# Patient Record
Sex: FEMALE | Race: OTHER | HISPANIC OR LATINO | URBAN - METROPOLITAN AREA
[De-identification: names, ages, dates, MRNs, and addresses within clinical notes are randomized per-mention and may not be internally consistent; named-entity substitution may affect disease eponyms.]

---

## 2017-10-08 ENCOUNTER — EMERGENCY (EMERGENCY)
Facility: HOSPITAL | Age: 72
LOS: 1 days | Discharge: HOME | End: 2017-10-08
Attending: EMERGENCY MEDICINE | Admitting: EMERGENCY MEDICINE
Payer: COMMERCIAL

## 2017-10-08 VITALS
HEART RATE: 72 BPM | WEIGHT: 145.95 LBS | HEIGHT: 64 IN | DIASTOLIC BLOOD PRESSURE: 81 MMHG | RESPIRATION RATE: 18 BRPM | TEMPERATURE: 99 F | OXYGEN SATURATION: 98 % | SYSTOLIC BLOOD PRESSURE: 132 MMHG

## 2017-10-08 VITALS
HEART RATE: 70 BPM | SYSTOLIC BLOOD PRESSURE: 126 MMHG | TEMPERATURE: 99 F | DIASTOLIC BLOOD PRESSURE: 73 MMHG | RESPIRATION RATE: 18 BRPM | OXYGEN SATURATION: 97 %

## 2017-10-08 DIAGNOSIS — Z98.51 TUBAL LIGATION STATUS: Chronic | ICD-10-CM

## 2017-10-08 DIAGNOSIS — Z98.89 OTHER SPECIFIED POSTPROCEDURAL STATES: Chronic | ICD-10-CM

## 2017-10-08 LAB
ALBUMIN SERPL ELPH-MCNC: 4.1 G/DL — SIGNIFICANT CHANGE UP (ref 3.3–5)
ALP SERPL-CCNC: 124 U/L — HIGH (ref 40–120)
ALT FLD-CCNC: 18 U/L — SIGNIFICANT CHANGE UP (ref 10–45)
ANION GAP SERPL CALC-SCNC: 13 MMOL/L — SIGNIFICANT CHANGE UP (ref 5–17)
APPEARANCE UR: CLEAR — SIGNIFICANT CHANGE UP
APTT BLD: 30.5 SEC — SIGNIFICANT CHANGE UP (ref 27.5–37.4)
AST SERPL-CCNC: 24 U/L — SIGNIFICANT CHANGE UP (ref 10–40)
BACTERIA # UR AUTO: PRESENT /HPF
BASOPHILS NFR BLD AUTO: 0.4 % — SIGNIFICANT CHANGE UP (ref 0–2)
BILIRUB SERPL-MCNC: 0.8 MG/DL — SIGNIFICANT CHANGE UP (ref 0.2–1.2)
BILIRUB UR-MCNC: NEGATIVE — SIGNIFICANT CHANGE UP
BUN SERPL-MCNC: 15 MG/DL — SIGNIFICANT CHANGE UP (ref 7–23)
CALCIUM SERPL-MCNC: 9.4 MG/DL — SIGNIFICANT CHANGE UP (ref 8.4–10.5)
CHLORIDE SERPL-SCNC: 104 MMOL/L — SIGNIFICANT CHANGE UP (ref 96–108)
CO2 SERPL-SCNC: 23 MMOL/L — SIGNIFICANT CHANGE UP (ref 22–31)
COLOR SPEC: YELLOW — SIGNIFICANT CHANGE UP
CREAT SERPL-MCNC: 0.87 MG/DL — SIGNIFICANT CHANGE UP (ref 0.5–1.3)
DIFF PNL FLD: (no result)
EOSINOPHIL NFR BLD AUTO: 2.3 % — SIGNIFICANT CHANGE UP (ref 0–6)
EPI CELLS # UR: SIGNIFICANT CHANGE UP /HPF
GLUCOSE SERPL-MCNC: 102 MG/DL — HIGH (ref 70–99)
GLUCOSE UR QL: NEGATIVE — SIGNIFICANT CHANGE UP
HCT VFR BLD CALC: 39.1 % — SIGNIFICANT CHANGE UP (ref 34.5–45)
HGB BLD-MCNC: 13.4 G/DL — SIGNIFICANT CHANGE UP (ref 11.5–15.5)
INR BLD: 1 — SIGNIFICANT CHANGE UP (ref 0.88–1.16)
KETONES UR-MCNC: NEGATIVE — SIGNIFICANT CHANGE UP
LEUKOCYTE ESTERASE UR-ACNC: NEGATIVE — SIGNIFICANT CHANGE UP
LYMPHOCYTES # BLD AUTO: 34.2 % — SIGNIFICANT CHANGE UP (ref 13–44)
MCHC RBC-ENTMCNC: 31.3 PG — SIGNIFICANT CHANGE UP (ref 27–34)
MCHC RBC-ENTMCNC: 34.3 G/DL — SIGNIFICANT CHANGE UP (ref 32–36)
MCV RBC AUTO: 91.4 FL — SIGNIFICANT CHANGE UP (ref 80–100)
MONOCYTES NFR BLD AUTO: 9.9 % — SIGNIFICANT CHANGE UP (ref 2–14)
NEUTROPHILS NFR BLD AUTO: 53.2 % — SIGNIFICANT CHANGE UP (ref 43–77)
NITRITE UR-MCNC: NEGATIVE — SIGNIFICANT CHANGE UP
PH UR: 5.5 — SIGNIFICANT CHANGE UP (ref 5–8)
PLATELET # BLD AUTO: 187 K/UL — SIGNIFICANT CHANGE UP (ref 150–400)
POTASSIUM SERPL-MCNC: 4.2 MMOL/L — SIGNIFICANT CHANGE UP (ref 3.5–5.3)
POTASSIUM SERPL-SCNC: 4.2 MMOL/L — SIGNIFICANT CHANGE UP (ref 3.5–5.3)
PROT SERPL-MCNC: 7.1 G/DL — SIGNIFICANT CHANGE UP (ref 6–8.3)
PROT UR-MCNC: NEGATIVE MG/DL — SIGNIFICANT CHANGE UP
PROTHROM AB SERPL-ACNC: 11.1 SEC — SIGNIFICANT CHANGE UP (ref 9.8–12.7)
RBC # BLD: 4.28 M/UL — SIGNIFICANT CHANGE UP (ref 3.8–5.2)
RBC # FLD: 12.4 % — SIGNIFICANT CHANGE UP (ref 10.3–16.9)
RBC CASTS # UR COMP ASSIST: (no result) /HPF
SODIUM SERPL-SCNC: 140 MMOL/L — SIGNIFICANT CHANGE UP (ref 135–145)
SP GR SPEC: 1.02 — SIGNIFICANT CHANGE UP (ref 1–1.03)
UROBILINOGEN FLD QL: 0.2 E.U./DL — SIGNIFICANT CHANGE UP
WBC # BLD: 5.6 K/UL — SIGNIFICANT CHANGE UP (ref 3.8–10.5)
WBC # FLD AUTO: 5.6 K/UL — SIGNIFICANT CHANGE UP (ref 3.8–10.5)
WBC UR QL: < 5 /HPF — SIGNIFICANT CHANGE UP

## 2017-10-08 PROCEDURE — 99285 EMERGENCY DEPT VISIT HI MDM: CPT

## 2017-10-08 PROCEDURE — 85730 THROMBOPLASTIN TIME PARTIAL: CPT

## 2017-10-08 PROCEDURE — 80053 COMPREHEN METABOLIC PANEL: CPT

## 2017-10-08 PROCEDURE — 74177 CT ABD & PELVIS W/CONTRAST: CPT | Mod: 26

## 2017-10-08 PROCEDURE — 81001 URINALYSIS AUTO W/SCOPE: CPT

## 2017-10-08 PROCEDURE — 85025 COMPLETE CBC W/AUTO DIFF WBC: CPT

## 2017-10-08 PROCEDURE — 96374 THER/PROPH/DIAG INJ IV PUSH: CPT | Mod: XU

## 2017-10-08 PROCEDURE — 85610 PROTHROMBIN TIME: CPT

## 2017-10-08 PROCEDURE — 99284 EMERGENCY DEPT VISIT MOD MDM: CPT | Mod: 25

## 2017-10-08 PROCEDURE — 74177 CT ABD & PELVIS W/CONTRAST: CPT

## 2017-10-08 PROCEDURE — 36415 COLL VENOUS BLD VENIPUNCTURE: CPT

## 2017-10-08 RX ORDER — IOHEXOL 300 MG/ML
50 INJECTION, SOLUTION INTRAVENOUS ONCE
Qty: 0 | Refills: 0 | Status: COMPLETED | OUTPATIENT
Start: 2017-10-08 | End: 2017-10-08

## 2017-10-08 RX ORDER — ONDANSETRON 8 MG/1
4 TABLET, FILM COATED ORAL ONCE
Qty: 0 | Refills: 0 | Status: COMPLETED | OUTPATIENT
Start: 2017-10-08 | End: 2017-10-08

## 2017-10-08 RX ORDER — SODIUM CHLORIDE 9 MG/ML
1000 INJECTION INTRAMUSCULAR; INTRAVENOUS; SUBCUTANEOUS ONCE
Qty: 0 | Refills: 0 | Status: COMPLETED | OUTPATIENT
Start: 2017-10-08 | End: 2017-10-08

## 2017-10-08 RX ADMIN — SODIUM CHLORIDE 1000 MILLILITER(S): 9 INJECTION INTRAMUSCULAR; INTRAVENOUS; SUBCUTANEOUS at 11:18

## 2017-10-08 RX ADMIN — ONDANSETRON 4 MILLIGRAM(S): 8 TABLET, FILM COATED ORAL at 11:22

## 2017-10-08 RX ADMIN — IOHEXOL 50 MILLILITER(S): 300 INJECTION, SOLUTION INTRAVENOUS at 11:22

## 2017-10-08 NOTE — ED ADULT NURSE NOTE - OBJECTIVE STATEMENT
Pt presents to ED c/o intermittent RLQ abdominal pain for the past few days, worsening this morning, associated w chills and mild nausea and no vomiting.

## 2017-10-08 NOTE — ED PROVIDER NOTE - MEDICAL DECISION MAKING DETAILS
pt with lower abd pain, nausea, decreased appetite, pe - mild right lower quadrant tenderness, labs show mild hematuria, ct with duodenitis - pt aware to follow up with PMD and to eat light small meals. also aware to have repeat u/a done

## 2017-10-08 NOTE — ED PROVIDER NOTE - OBJECTIVE STATEMENT
71 yo f with pmh of GERD, tubal ligation, vertigo, transvaginal myomectomy presents to ED with right lower quadrant pain x 1 day associated with nausea and decreased appetite.  Denies fever, chills, urinary symptoms.  No vomiting or diarrhea.

## 2017-10-08 NOTE — ED ADULT NURSE NOTE - CHPI ED SYMPTOMS NEG
no vomiting/no diarrhea/no abdominal distension/no burning urination/no blood in stool/no hematuria/no fever/no dysuria

## 2017-10-08 NOTE — ED ADULT TRIAGE NOTE - OTHER COMPLAINTS
Pt C/O RLQ pain, chills and nausea since 4AM. Pt denies urinary symptom, diarrhea, vomiting, chest pain, SOB, dizziness. Hx of GERD, vertigo.

## 2017-10-12 DIAGNOSIS — K52.9 NONINFECTIVE GASTROENTERITIS AND COLITIS, UNSPECIFIED: ICD-10-CM

## 2017-10-12 DIAGNOSIS — R10.31 RIGHT LOWER QUADRANT PAIN: ICD-10-CM

## 2017-10-12 DIAGNOSIS — Z79.899 OTHER LONG TERM (CURRENT) DRUG THERAPY: ICD-10-CM

## 2018-07-16 ENCOUNTER — INPATIENT (INPATIENT)
Facility: HOSPITAL | Age: 73
LOS: 1 days | Discharge: HOME | DRG: 419 | End: 2018-07-18
Attending: SURGERY | Admitting: SURGERY
Payer: MEDICARE

## 2018-07-16 VITALS
WEIGHT: 145.95 LBS | HEART RATE: 67 BPM | TEMPERATURE: 98 F | OXYGEN SATURATION: 98 % | DIASTOLIC BLOOD PRESSURE: 84 MMHG | SYSTOLIC BLOOD PRESSURE: 153 MMHG | RESPIRATION RATE: 18 BRPM

## 2018-07-16 DIAGNOSIS — Z98.89 OTHER SPECIFIED POSTPROCEDURAL STATES: Chronic | ICD-10-CM

## 2018-07-16 DIAGNOSIS — K81.9 CHOLECYSTITIS, UNSPECIFIED: ICD-10-CM

## 2018-07-16 DIAGNOSIS — R42 DIZZINESS AND GIDDINESS: ICD-10-CM

## 2018-07-16 DIAGNOSIS — K21.9 GASTRO-ESOPHAGEAL REFLUX DISEASE WITHOUT ESOPHAGITIS: ICD-10-CM

## 2018-07-16 DIAGNOSIS — Z98.51 TUBAL LIGATION STATUS: Chronic | ICD-10-CM

## 2018-07-16 DIAGNOSIS — M81.0 AGE-RELATED OSTEOPOROSIS WITHOUT CURRENT PATHOLOGICAL FRACTURE: ICD-10-CM

## 2018-07-16 LAB
ALBUMIN SERPL ELPH-MCNC: 4.3 G/DL — SIGNIFICANT CHANGE UP (ref 3.3–5)
ALP SERPL-CCNC: 120 U/L — SIGNIFICANT CHANGE UP (ref 40–120)
ALT FLD-CCNC: 12 U/L — SIGNIFICANT CHANGE UP (ref 10–45)
ANION GAP SERPL CALC-SCNC: 12 MMOL/L — SIGNIFICANT CHANGE UP (ref 5–17)
APPEARANCE UR: CLEAR — SIGNIFICANT CHANGE UP
APTT BLD: 26.8 SEC — LOW (ref 27.5–37.4)
APTT BLD: 29.1 SEC — SIGNIFICANT CHANGE UP (ref 27.5–37.4)
AST SERPL-CCNC: 22 U/L — SIGNIFICANT CHANGE UP (ref 10–40)
BACTERIA # UR AUTO: PRESENT /HPF
BASOPHILS NFR BLD AUTO: 0.3 % — SIGNIFICANT CHANGE UP (ref 0–2)
BILIRUB SERPL-MCNC: 0.6 MG/DL — SIGNIFICANT CHANGE UP (ref 0.2–1.2)
BILIRUB UR-MCNC: NEGATIVE — SIGNIFICANT CHANGE UP
BLD GP AB SCN SERPL QL: NEGATIVE — SIGNIFICANT CHANGE UP
BLD GP AB SCN SERPL QL: NEGATIVE — SIGNIFICANT CHANGE UP
BUN SERPL-MCNC: 17 MG/DL — SIGNIFICANT CHANGE UP (ref 7–23)
CALCIUM SERPL-MCNC: 9.8 MG/DL — SIGNIFICANT CHANGE UP (ref 8.4–10.5)
CHLORIDE SERPL-SCNC: 102 MMOL/L — SIGNIFICANT CHANGE UP (ref 96–108)
CO2 SERPL-SCNC: 25 MMOL/L — SIGNIFICANT CHANGE UP (ref 22–31)
COLOR SPEC: YELLOW — SIGNIFICANT CHANGE UP
CREAT SERPL-MCNC: 0.88 MG/DL — SIGNIFICANT CHANGE UP (ref 0.5–1.3)
DIFF PNL FLD: ABNORMAL
EOSINOPHIL NFR BLD AUTO: 1.4 % — SIGNIFICANT CHANGE UP (ref 0–6)
EPI CELLS # UR: SIGNIFICANT CHANGE UP /HPF (ref 0–5)
GLUCOSE SERPL-MCNC: 150 MG/DL — HIGH (ref 70–99)
GLUCOSE UR QL: NEGATIVE — SIGNIFICANT CHANGE UP
HCT VFR BLD CALC: 41.8 % — SIGNIFICANT CHANGE UP (ref 34.5–45)
HGB BLD-MCNC: 13.5 G/DL — SIGNIFICANT CHANGE UP (ref 11.5–15.5)
INR BLD: 1.02 — SIGNIFICANT CHANGE UP (ref 0.88–1.16)
INR BLD: 1.05 — SIGNIFICANT CHANGE UP (ref 0.88–1.16)
KETONES UR-MCNC: NEGATIVE — SIGNIFICANT CHANGE UP
LEUKOCYTE ESTERASE UR-ACNC: ABNORMAL
LIDOCAIN IGE QN: 135 U/L — HIGH (ref 7–60)
LYMPHOCYTES # BLD AUTO: 15.5 % — SIGNIFICANT CHANGE UP (ref 13–44)
MCHC RBC-ENTMCNC: 31 PG — SIGNIFICANT CHANGE UP (ref 27–34)
MCHC RBC-ENTMCNC: 32.3 G/DL — SIGNIFICANT CHANGE UP (ref 32–36)
MCV RBC AUTO: 96.1 FL — SIGNIFICANT CHANGE UP (ref 80–100)
MONOCYTES NFR BLD AUTO: 6.3 % — SIGNIFICANT CHANGE UP (ref 2–14)
NEUTROPHILS NFR BLD AUTO: 76.5 % — SIGNIFICANT CHANGE UP (ref 43–77)
NITRITE UR-MCNC: NEGATIVE — SIGNIFICANT CHANGE UP
PH UR: 7 — SIGNIFICANT CHANGE UP (ref 5–8)
PLATELET # BLD AUTO: 163 K/UL — SIGNIFICANT CHANGE UP (ref 150–400)
POTASSIUM SERPL-MCNC: 4 MMOL/L — SIGNIFICANT CHANGE UP (ref 3.5–5.3)
POTASSIUM SERPL-SCNC: 4 MMOL/L — SIGNIFICANT CHANGE UP (ref 3.5–5.3)
PROT SERPL-MCNC: 7.4 G/DL — SIGNIFICANT CHANGE UP (ref 6–8.3)
PROT UR-MCNC: NEGATIVE MG/DL — SIGNIFICANT CHANGE UP
PROTHROM AB SERPL-ACNC: 11.3 SEC — SIGNIFICANT CHANGE UP (ref 9.8–12.7)
PROTHROM AB SERPL-ACNC: 11.7 SEC — SIGNIFICANT CHANGE UP (ref 9.8–12.7)
RBC # BLD: 4.35 M/UL — SIGNIFICANT CHANGE UP (ref 3.8–5.2)
RBC # FLD: 12.3 % — SIGNIFICANT CHANGE UP (ref 10.3–16.9)
RBC CASTS # UR COMP ASSIST: ABNORMAL /HPF
RH IG SCN BLD-IMP: POSITIVE — SIGNIFICANT CHANGE UP
RH IG SCN BLD-IMP: POSITIVE — SIGNIFICANT CHANGE UP
SODIUM SERPL-SCNC: 139 MMOL/L — SIGNIFICANT CHANGE UP (ref 135–145)
SP GR SPEC: 1.02 — SIGNIFICANT CHANGE UP (ref 1–1.03)
UROBILINOGEN FLD QL: 0.2 E.U./DL — SIGNIFICANT CHANGE UP
WBC # BLD: 7.9 K/UL — SIGNIFICANT CHANGE UP (ref 3.8–10.5)
WBC # FLD AUTO: 7.9 K/UL — SIGNIFICANT CHANGE UP (ref 3.8–10.5)
WBC UR QL: < 5 /HPF — SIGNIFICANT CHANGE UP

## 2018-07-16 PROCEDURE — 74177 CT ABD & PELVIS W/CONTRAST: CPT | Mod: 26

## 2018-07-16 PROCEDURE — 76705 ECHO EXAM OF ABDOMEN: CPT | Mod: 26

## 2018-07-16 PROCEDURE — 71046 X-RAY EXAM CHEST 2 VIEWS: CPT | Mod: 26

## 2018-07-16 PROCEDURE — 99285 EMERGENCY DEPT VISIT HI MDM: CPT | Mod: 25

## 2018-07-16 PROCEDURE — 93010 ELECTROCARDIOGRAM REPORT: CPT

## 2018-07-16 RX ORDER — CEFAZOLIN SODIUM 1 G
2000 VIAL (EA) INJECTION EVERY 8 HOURS
Qty: 0 | Refills: 0 | Status: DISCONTINUED | OUTPATIENT
Start: 2018-07-16 | End: 2018-07-17

## 2018-07-16 RX ORDER — PANTOPRAZOLE SODIUM 20 MG/1
40 TABLET, DELAYED RELEASE ORAL ONCE
Qty: 0 | Refills: 0 | Status: COMPLETED | OUTPATIENT
Start: 2018-07-16 | End: 2018-07-16

## 2018-07-16 RX ORDER — PIPERACILLIN AND TAZOBACTAM 4; .5 G/20ML; G/20ML
3.38 INJECTION, POWDER, LYOPHILIZED, FOR SOLUTION INTRAVENOUS ONCE
Qty: 0 | Refills: 0 | Status: COMPLETED | OUTPATIENT
Start: 2018-07-16 | End: 2018-07-16

## 2018-07-16 RX ORDER — HEPARIN SODIUM 5000 [USP'U]/ML
5000 INJECTION INTRAVENOUS; SUBCUTANEOUS EVERY 8 HOURS
Qty: 0 | Refills: 0 | Status: DISCONTINUED | OUTPATIENT
Start: 2018-07-16 | End: 2018-07-18

## 2018-07-16 RX ORDER — SODIUM CHLORIDE 9 MG/ML
1000 INJECTION, SOLUTION INTRAVENOUS
Qty: 0 | Refills: 0 | Status: DISCONTINUED | OUTPATIENT
Start: 2018-07-16 | End: 2018-07-17

## 2018-07-16 RX ORDER — ACETAMINOPHEN 500 MG
650 TABLET ORAL EVERY 6 HOURS
Qty: 0 | Refills: 0 | Status: DISCONTINUED | OUTPATIENT
Start: 2018-07-16 | End: 2018-07-17

## 2018-07-16 RX ORDER — ONDANSETRON 8 MG/1
4 TABLET, FILM COATED ORAL EVERY 6 HOURS
Qty: 0 | Refills: 0 | Status: DISCONTINUED | OUTPATIENT
Start: 2018-07-16 | End: 2018-07-18

## 2018-07-16 RX ORDER — IOHEXOL 300 MG/ML
30 INJECTION, SOLUTION INTRAVENOUS ONCE
Qty: 0 | Refills: 0 | Status: COMPLETED | OUTPATIENT
Start: 2018-07-16 | End: 2018-07-16

## 2018-07-16 RX ORDER — ONDANSETRON 8 MG/1
4 TABLET, FILM COATED ORAL ONCE
Qty: 0 | Refills: 0 | Status: COMPLETED | OUTPATIENT
Start: 2018-07-16 | End: 2018-07-16

## 2018-07-16 RX ORDER — FAMOTIDINE 10 MG/ML
20 INJECTION INTRAVENOUS ONCE
Qty: 0 | Refills: 0 | Status: COMPLETED | OUTPATIENT
Start: 2018-07-16 | End: 2018-07-16

## 2018-07-16 RX ADMIN — Medication 100 MILLIGRAM(S): at 22:34

## 2018-07-16 RX ADMIN — PANTOPRAZOLE SODIUM 40 MILLIGRAM(S): 20 TABLET, DELAYED RELEASE ORAL at 07:50

## 2018-07-16 RX ADMIN — IOHEXOL 30 MILLILITER(S): 300 INJECTION, SOLUTION INTRAVENOUS at 07:50

## 2018-07-16 RX ADMIN — HEPARIN SODIUM 5000 UNIT(S): 5000 INJECTION INTRAVENOUS; SUBCUTANEOUS at 22:33

## 2018-07-16 RX ADMIN — PIPERACILLIN AND TAZOBACTAM 200 GRAM(S): 4; .5 INJECTION, POWDER, LYOPHILIZED, FOR SOLUTION INTRAVENOUS at 14:44

## 2018-07-16 RX ADMIN — ONDANSETRON 4 MILLIGRAM(S): 8 TABLET, FILM COATED ORAL at 07:50

## 2018-07-16 RX ADMIN — SODIUM CHLORIDE 125 MILLILITER(S): 9 INJECTION, SOLUTION INTRAVENOUS at 18:30

## 2018-07-16 RX ADMIN — Medication 30 MILLILITER(S): at 07:50

## 2018-07-16 RX ADMIN — FAMOTIDINE 20 MILLIGRAM(S): 10 INJECTION INTRAVENOUS at 10:56

## 2018-07-16 NOTE — ED PROVIDER NOTE - PROGRESS NOTE DETAILS
CT concerning for cholecystitis. WBC, lft's nl but lipase elevated.  ? cbd stone.  RUQ u/s ordered. RUQ u/s +; surg and gi consulted.  Abx ordered. Pt reviewed by gi - no pancreatitis on ct, nl lft's - they do not feel pt needs intervention and defer mgmt to surgery.  Surg eval pt, await their final recommendations.  Pt resting, pain free in ed since this am. TBA to surg

## 2018-07-16 NOTE — H&P ADULT - ASSESSMENT
74 yo F relatively healthy, found to have signs of acute cholecystitis on RUQ U/S    Will admit to [facility] under Dr. Laureano for ACS  NPO/IVF  Medicine Clearance  Add on for OR tomorrow for lap cholecystectomy   Minimal Pain Meds  Heparin SUBQ  IS/OOB

## 2018-07-16 NOTE — CONSULT NOTE ADULT - SUBJECTIVE AND OBJECTIVE BOX
INTERVAL HPI/OVERNIGHT EVENTS:  Patient seen and examined; Patient with abdominal pain and vomiting for 2 days; Today was the worst pain; No other medical problems except GERD; No cardio or pulmonary disease. No Diabetes; No smoking or ETOH          MEDICATIONS  (STANDING):  ceFAZolin   IVPB 2000 milliGRAM(s) IV Intermittent every 8 hours  heparin  Injectable 5000 Unit(s) SubCutaneous every 8 hours  lactated ringers. 1000 milliLiter(s) (125 mL/Hr) IV Continuous <Continuous>    MEDICATIONS  (PRN):  acetaminophen   Tablet. 650 milliGRAM(s) Oral every 6 hours PRN Moderate Pain (4 - 6)  ondansetron Injectable 4 milliGRAM(s) IV Push every 6 hours PRN Nausea      Allergies    No Known Allergies    Intolerances        Vital Signs Last 24 Hrs  T(C): 36.5 (2018 18:17), Max: 36.9 (2018 16:59)  T(F): 97.7 (2018 18:17), Max: 98.4 (2018 16:59)  HR: 62 (2018 18:17) (60 - 67)  BP: 130/83 (2018 18:17) (118/78 - 153/84)  BP(mean): --  RR: 16 (2018 18:17) (16 - 18)  SpO2: 95% (2018 18:17) (95% - 98%)          Constitutional: Awake    Eyes: ALISSA    ENMT: Negative    Neck: Supple    Back:  no tenderness     Respiratory:   clear    Cardiovascular: S1 S2    Gastrointestinal:  soft No real abdominal pain on palpation    Genitourinary:    Extremities: no edema    Vascular:    Neurological:    Skin:    Lymph Nodes:            LABS:                        13.5   7.9   )-----------( 163      ( 2018 06:48 )             41.8     07-16    139  |  102  |  17  ----------------------------<  150<H>  4.0   |  25  |  0.88    Ca    9.8      2018 06:48    TPro  7.4  /  Alb  4.3  /  TBili  0.6  /  DBili  x   /  AST  22  /  ALT  12  /  AlkPhos  120  07-16    PT/INR - ( 2018 14:40 )   PT: 11.3 sec;   INR: 1.02          PTT - ( 2018 14:40 )  PTT:26.8 sec  Urinalysis Basic - ( 2018 07:41 )    Color: Yellow / Appearance: Clear / S.020 / pH: x  Gluc: x / Ketone: NEGATIVE  / Bili: Negative / Urobili: 0.2 E.U./dL   Blood: x / Protein: NEGATIVE mg/dL / Nitrite: NEGATIVE   Leuk Esterase: Trace / RBC: 5-10 /HPF / WBC < 5 /HPF   Sq Epi: x / Non Sq Epi: 0-5 /HPF / Bacteria: Present /HPF        RADIOLOGY & ADDITIONAL TESTS:

## 2018-07-16 NOTE — ED PROVIDER NOTE - GASTROINTESTINAL, MLM
Abdomen soft, mild periumbilical and epigastric ttp, no rebound, no guarding. + referred ttp to rlq w/o sig rlq ttp

## 2018-07-16 NOTE — H&P ADULT - NSHPPHYSICALEXAM_GEN_ALL_CORE
General: NAD  Neurology: A&Ox3, nonfocal, ALSTON x 4  Eyes: EOMI, Gross vision intact  ENT/Neck: Neck supple, trachea midline, Gross hearing intact  Respiratory: CTA B/L, No wheezing, rales, rhonchi  CV: RRR, S1S2, no murmurs, rubs or gallops  Abdominal: Soft, No RUQ tenderness ND +BS,   Extremities: No edema, + peripheral pulses  Skin: No Rashes, Hematoma, Ecchymosis

## 2018-07-16 NOTE — ED PROVIDER NOTE - OBJECTIVE STATEMENT
72 yo female h/o GERD, tubal ligation, vertigo, transvaginal myomectomy, migraine c/o 72 yo female h/o GERD, tubal ligation, vertigo, transvaginal myomectomy, migraine c/o abd pain diffuse abd starting ~ 130a last pm w n/v x 1 (resembled food she ate, no blood) w small bm (nonbloody, no black stool) x 1.  Pain was 9/10, now 3/10.  Pt took tums but no other meds.  Pt reports similar pain last wk x several hours that resolved.  No h/o food intol, sx don't feel like her gerd.  No fever, dysuria, freq, urgency, hematuria, flank pain. No h/o gallstone.  Pain aching, constant, no radiation. No sick contacts, travel, suspicious foods.  No abd distension.

## 2018-07-16 NOTE — H&P ADULT - ATTENDING COMMENTS
Late note.  Pt seen on admission in ED.  Agree with above.  Also reports belt like pain around mid abdomen, which has resolved now.  Suspect acute cholecystitis with concomitant choledocholithiasis (belt like pain and mild elevation of lipase)  Recommend cholecystectomy.  Medical evaluation and clearance for surgery.  NPO, IVF, IV Abx.

## 2018-07-16 NOTE — H&P ADULT - HISTORY OF PRESENT ILLNESS
74 yo F, presenting with 2 days of intermittent epigastric pain, concern for acute cholecystitis. Patient with hx of PUD most recent endoscopy 3 years ago with evidence of gastritis. She states that her first episode of abdominal pain was Friday evening, it was nonspecific with no N/V/ No diarrhea, no fevers or chills. Pain went away on its own without any intervention. She was in her usual state of her health until Monday early AM, when the epigastric pain woke her up, she felt nauseous and decided to come to ED. At this time patient says pain has resolved, and she feels "like her self"    Patient reports she has never had pain in this fashion before. No fever/chills, no vomiting. Has underwent tubal ligation, no other abdominal operations.

## 2018-07-16 NOTE — ED PROVIDER NOTE - MEDICAL DECISION MAKING DETAILS
Pt c/o diffuse abd pain now much better but still present w n/v w mild ttp on exam.  ? early appy since referred ttp to rlq, ? biliary colic (no ruq ttp), low suspicion for sbo since minimally invasive abd surg w tubal ligation and no distension on exam, no uti sx.  Plan labs, ct abd; pt declined pain meds.  Reassess.

## 2018-07-16 NOTE — PROGRESS NOTE ADULT - SUBJECTIVE AND OBJECTIVE BOX
Team 4 Surgery Preop Note    73y old woman who presents with a chief complaint of abdominal pain.    Diagnosis: acute cholecystitis  Procedure: laparoscopic cholecystectomy  Surgeon: Georgi                          13.5   7.9   )-----------( 163      ( 2018 06:48 )             41.8     07-16    139  |  102  |  17  ----------------------------<  150<H>  4.0   |  25  |  0.88    Ca    9.8      2018 06:48    TPro  7.4  /  Alb  4.3  /  TBili  0.6  /  DBili  x   /  AST  22  /  ALT  12  /  AlkPhos  120  0716    PT/INR - ( 2018 19:09 )   PT: 11.7 sec;   INR: 1.05          PTT - ( 2018 19:09 )  PTT:29.1 sec  Urinalysis Basic - ( 2018 07:41 )    Color: Yellow / Appearance: Clear / S.020 / pH: x  Gluc: x / Ketone: NEGATIVE  / Bili: Negative / Urobili: 0.2 E.U./dL   Blood: x / Protein: NEGATIVE mg/dL / Nitrite: NEGATIVE   Leuk Esterase: Trace / RBC: 5-10 /HPF / WBC < 5 /HPF   Sq Epi: x / Non Sq Epi: 0-5 /HPF / Bacteria: Present /HPF        [ordered] Type & Screen  [x] CBC  [x] BMP  [x] PT/PTT/INR  [x] Urinalysis  [x] Chest X-ray  [ordered] EKG  [x] NPO/IVF  [pending] Consent  [n/a] Clearance  [x] Added on to OR Schedule  [n/a] Anti-coagulation held Team 4 Surgery Preop Note    73y old woman who presents with a chief complaint of abdominal pain.    Diagnosis: acute cholecystitis  Procedure: laparoscopic cholecystectomy  Surgeon: Georgi                          13.5   7.9   )-----------( 163      ( 2018 06:48 )             41.8     07-16    139  |  102  |  17  ----------------------------<  150<H>  4.0   |  25  |  0.88    Ca    9.8      2018 06:48    TPro  7.4  /  Alb  4.3  /  TBili  0.6  /  DBili  x   /  AST  22  /  ALT  12  /  AlkPhos  120  07-16    PT/INR - ( 2018 19:09 )   PT: 11.7 sec;   INR: 1.05          PTT - ( 2018 19:09 )  PTT:29.1 sec  Urinalysis Basic - ( 2018 07:41 )    Color: Yellow / Appearance: Clear / S.020 / pH: x  Gluc: x / Ketone: NEGATIVE  / Bili: Negative / Urobili: 0.2 E.U./dL   Blood: x / Protein: NEGATIVE mg/dL / Nitrite: NEGATIVE   Leuk Esterase: Trace / RBC: 5-10 /HPF / WBC < 5 /HPF   Sq Epi: x / Non Sq Epi: 0-5 /HPF / Bacteria: Present /HPF        [ordered] Type & Screen  [x] CBC  [x] BMP  [x] PT/PTT/INR  [x] Urinalysis  [x] Chest X-ray  [ordered] EKG  [x] NPO/IVF  [x] Consent  [n/a] Clearance  [x] Added on to OR Schedule  [n/a] Anti-coagulation held [facility] 4 Surgery Preop Note    73y old woman who presents with a chief complaint of abdominal pain.    Diagnosis: acute cholecystitis  Procedure: laparoscopic cholecystectomy  Surgeon: Georgi                          13.5   7.9   )-----------( 163      ( 2018 06:48 )             41.8     07-16    139  |  102  |  17  ----------------------------<  150<H>  4.0   |  25  |  0.88    Ca    9.8      2018 06:48    TPro  7.4  /  Alb  4.3  /  TBili  0.6  /  DBili  x   /  AST  22  /  ALT  12  /  AlkPhos  120  07-16    PT/INR - ( 2018 19:09 )   PT: 11.7 sec;   INR: 1.05          PTT - ( 2018 19:09 )  PTT:29.1 sec  Urinalysis Basic - ( 2018 07:41 )    Color: Yellow / Appearance: Clear / S.020 / pH: x  Gluc: x / Ketone: NEGATIVE  / Bili: Negative / Urobili: 0.2 E.U./dL   Blood: x / Protein: NEGATIVE mg/dL / Nitrite: NEGATIVE   Leuk Esterase: Trace / RBC: 5-10 /HPF / WBC < 5 /HPF   Sq Epi: x / Non Sq Epi: 0-5 /HPF / Bacteria: Present /HPF        [ordered] Type & Screen  [x] CBC  [x] BMP  [x] PT/PTT/INR  [x] Urinalysis  [x] Chest X-ray  [ordered] EKG  [x] NPO/IVF  [x] Consent  [x] Clearance  [x] Added on to OR Schedule  [n/a] Anti-coagulation held

## 2018-07-16 NOTE — CONSULT NOTE ADULT - ATTENDING COMMENTS
Patient seen and examined; Cleared for OR at good risk; Chest Xray clear; EKG Sinus without acute changes; Call for problems

## 2018-07-16 NOTE — H&P ADULT - NSHPREVIEWOFSYSTEMS_GEN_ALL_CORE
REVIEW OF SYSTEMS:    CONSTITUTIONAL: No weakness, fevers or chills  EYES/ENT: No visual changes;  No vertigo or throat pain   NECK: No pain or stiffness  RESPIRATORY: No cough, wheezing, hemoptysis; No shortness of breath  CARDIOVASCULAR: No chest pain or palpitations  GASTROINTESTINAL:+ Epigastric pain and nausea; No diarrhea or constipation. No melena or hematochezia.  GENITOURINARY: No dysuria, frequency or hematuria  NEUROLOGICAL: No numbness or weakness  SKIN: No itching, rashes

## 2018-07-16 NOTE — H&P ADULT - NSHPLABSRESULTS_GEN_ALL_CORE
LABS/RADIOLOGY RESULTS:                          13.5   7.9   )-----------( 163      ( 2018 06:48 )             41.8   07-    139  |  102  |  17  ----------------------------<  150<H>  4.0   |  25  |  0.88    Ca    9.8      2018 06:48    TPro  7.4  /  Alb  4.3  /  TBili  0.6  /  DBili  x   /  AST  22  /  ALT  12  /  AlkPhos  120  07-16  PT/INR - ( 2018 14:40 )   PT: 11.3 sec;   INR: 1.02          PTT - ( 2018 14:40 )  PTT:26.8 secBlood Cultures    Urinalysis Basic - ( 2018 07:41 )    Color: Yellow / Appearance: Clear / S.020 / pH:   Gluc:  / Ketone: NEGATIVE  / Bili: Negative / Urobili: 0.2 E.U./dL   Blood:  / Protein: NEGATIVE mg/dL / Nitrite: NEGATIVE   Leuk Esterase: Trace / RBC: 5-10 /HPF / WBC < 5 /HPF   Sq Epi:  / Non Sq Epi: 0-5 /HPF / Bacteria: Present /HPF

## 2018-07-17 ENCOUNTER — RESULT REVIEW (OUTPATIENT)
Age: 73
End: 2018-07-17

## 2018-07-17 LAB
ALBUMIN SERPL ELPH-MCNC: 3.6 G/DL — SIGNIFICANT CHANGE UP (ref 3.3–5)
ALP SERPL-CCNC: 91 U/L — SIGNIFICANT CHANGE UP (ref 40–120)
ALT FLD-CCNC: 10 U/L — SIGNIFICANT CHANGE UP (ref 10–45)
ANION GAP SERPL CALC-SCNC: 10 MMOL/L — SIGNIFICANT CHANGE UP (ref 5–17)
AST SERPL-CCNC: 18 U/L — SIGNIFICANT CHANGE UP (ref 10–40)
BASOPHILS NFR BLD AUTO: 0.4 % — SIGNIFICANT CHANGE UP (ref 0–2)
BILIRUB SERPL-MCNC: 1.3 MG/DL — HIGH (ref 0.2–1.2)
BUN SERPL-MCNC: 10 MG/DL — SIGNIFICANT CHANGE UP (ref 7–23)
CALCIUM SERPL-MCNC: 9.1 MG/DL — SIGNIFICANT CHANGE UP (ref 8.4–10.5)
CHLORIDE SERPL-SCNC: 105 MMOL/L — SIGNIFICANT CHANGE UP (ref 96–108)
CO2 SERPL-SCNC: 26 MMOL/L — SIGNIFICANT CHANGE UP (ref 22–31)
CREAT SERPL-MCNC: 0.89 MG/DL — SIGNIFICANT CHANGE UP (ref 0.5–1.3)
EOSINOPHIL NFR BLD AUTO: 3.5 % — SIGNIFICANT CHANGE UP (ref 0–6)
GLUCOSE SERPL-MCNC: 89 MG/DL — SIGNIFICANT CHANGE UP (ref 70–99)
HCT VFR BLD CALC: 38.7 % — SIGNIFICANT CHANGE UP (ref 34.5–45)
HGB BLD-MCNC: 12.6 G/DL — SIGNIFICANT CHANGE UP (ref 11.5–15.5)
LYMPHOCYTES # BLD AUTO: 42.6 % — SIGNIFICANT CHANGE UP (ref 13–44)
MAGNESIUM SERPL-MCNC: 2.1 MG/DL — SIGNIFICANT CHANGE UP (ref 1.6–2.6)
MCHC RBC-ENTMCNC: 31.4 PG — SIGNIFICANT CHANGE UP (ref 27–34)
MCHC RBC-ENTMCNC: 32.6 G/DL — SIGNIFICANT CHANGE UP (ref 32–36)
MCV RBC AUTO: 96.5 FL — SIGNIFICANT CHANGE UP (ref 80–100)
MONOCYTES NFR BLD AUTO: 9.7 % — SIGNIFICANT CHANGE UP (ref 2–14)
NEUTROPHILS NFR BLD AUTO: 43.8 % — SIGNIFICANT CHANGE UP (ref 43–77)
PHOSPHATE SERPL-MCNC: 2.9 MG/DL — SIGNIFICANT CHANGE UP (ref 2.5–4.5)
PLATELET # BLD AUTO: 149 K/UL — LOW (ref 150–400)
POTASSIUM SERPL-MCNC: 3.8 MMOL/L — SIGNIFICANT CHANGE UP (ref 3.5–5.3)
POTASSIUM SERPL-SCNC: 3.8 MMOL/L — SIGNIFICANT CHANGE UP (ref 3.5–5.3)
PROT SERPL-MCNC: 5.8 G/DL — LOW (ref 6–8.3)
RBC # BLD: 4.01 M/UL — SIGNIFICANT CHANGE UP (ref 3.8–5.2)
RBC # FLD: 12.2 % — SIGNIFICANT CHANGE UP (ref 10.3–16.9)
SODIUM SERPL-SCNC: 141 MMOL/L — SIGNIFICANT CHANGE UP (ref 135–145)
WBC # BLD: 5.1 K/UL — SIGNIFICANT CHANGE UP (ref 3.8–10.5)
WBC # FLD AUTO: 5.1 K/UL — SIGNIFICANT CHANGE UP (ref 3.8–10.5)

## 2018-07-17 RX ORDER — ACETAMINOPHEN 500 MG
1000 TABLET ORAL ONCE
Qty: 0 | Refills: 0 | Status: COMPLETED | OUTPATIENT
Start: 2018-07-17 | End: 2018-07-17

## 2018-07-17 RX ORDER — SODIUM CHLORIDE 9 MG/ML
1000 INJECTION, SOLUTION INTRAVENOUS
Qty: 0 | Refills: 0 | Status: DISCONTINUED | OUTPATIENT
Start: 2018-07-17 | End: 2018-07-17

## 2018-07-17 RX ORDER — POTASSIUM CHLORIDE 20 MEQ
10 PACKET (EA) ORAL ONCE
Qty: 0 | Refills: 0 | Status: COMPLETED | OUTPATIENT
Start: 2018-07-17 | End: 2018-07-17

## 2018-07-17 RX ORDER — METOCLOPRAMIDE HCL 10 MG
10 TABLET ORAL ONCE
Qty: 0 | Refills: 0 | Status: COMPLETED | OUTPATIENT
Start: 2018-07-17 | End: 2018-07-17

## 2018-07-17 RX ORDER — AMPICILLIN SODIUM AND SULBACTAM SODIUM 250; 125 MG/ML; MG/ML
3 INJECTION, POWDER, FOR SUSPENSION INTRAMUSCULAR; INTRAVENOUS ONCE
Qty: 0 | Refills: 0 | Status: DISCONTINUED | OUTPATIENT
Start: 2018-07-17 | End: 2018-07-18

## 2018-07-17 RX ORDER — AMPICILLIN SODIUM AND SULBACTAM SODIUM 250; 125 MG/ML; MG/ML
3 INJECTION, POWDER, FOR SUSPENSION INTRAMUSCULAR; INTRAVENOUS EVERY 6 HOURS
Qty: 0 | Refills: 0 | Status: DISCONTINUED | OUTPATIENT
Start: 2018-07-17 | End: 2018-07-18

## 2018-07-17 RX ORDER — SODIUM CHLORIDE 9 MG/ML
1000 INJECTION, SOLUTION INTRAVENOUS
Qty: 0 | Refills: 0 | Status: DISCONTINUED | OUTPATIENT
Start: 2018-07-17 | End: 2018-07-18

## 2018-07-17 RX ORDER — ONDANSETRON 8 MG/1
4 TABLET, FILM COATED ORAL ONCE
Qty: 0 | Refills: 0 | Status: COMPLETED | OUTPATIENT
Start: 2018-07-17 | End: 2018-07-17

## 2018-07-17 RX ORDER — OXYCODONE AND ACETAMINOPHEN 5; 325 MG/1; MG/1
2 TABLET ORAL EVERY 4 HOURS
Qty: 0 | Refills: 0 | Status: DISCONTINUED | OUTPATIENT
Start: 2018-07-17 | End: 2018-07-18

## 2018-07-17 RX ORDER — OXYCODONE AND ACETAMINOPHEN 5; 325 MG/1; MG/1
1 TABLET ORAL EVERY 4 HOURS
Qty: 0 | Refills: 0 | Status: DISCONTINUED | OUTPATIENT
Start: 2018-07-17 | End: 2018-07-18

## 2018-07-17 RX ORDER — HYDROMORPHONE HYDROCHLORIDE 2 MG/ML
0.5 INJECTION INTRAMUSCULAR; INTRAVENOUS; SUBCUTANEOUS
Qty: 0 | Refills: 0 | Status: DISCONTINUED | OUTPATIENT
Start: 2018-07-17 | End: 2018-07-17

## 2018-07-17 RX ADMIN — Medication 1000 MILLIGRAM(S): at 01:58

## 2018-07-17 RX ADMIN — Medication 10 MILLIGRAM(S): at 17:32

## 2018-07-17 RX ADMIN — AMPICILLIN SODIUM AND SULBACTAM SODIUM 200 GRAM(S): 250; 125 INJECTION, POWDER, FOR SUSPENSION INTRAMUSCULAR; INTRAVENOUS at 23:00

## 2018-07-17 RX ADMIN — Medication 400 MILLIGRAM(S): at 01:22

## 2018-07-17 RX ADMIN — ONDANSETRON 4 MILLIGRAM(S): 8 TABLET, FILM COATED ORAL at 06:38

## 2018-07-17 RX ADMIN — ONDANSETRON 4 MILLIGRAM(S): 8 TABLET, FILM COATED ORAL at 16:02

## 2018-07-17 RX ADMIN — Medication 100 MILLIGRAM(S): at 06:00

## 2018-07-17 RX ADMIN — Medication 400 MILLIGRAM(S): at 06:44

## 2018-07-17 RX ADMIN — Medication 100 MILLIEQUIVALENT(S): at 08:09

## 2018-07-17 RX ADMIN — Medication 1000 MILLIGRAM(S): at 07:40

## 2018-07-17 RX ADMIN — ONDANSETRON 4 MILLIGRAM(S): 8 TABLET, FILM COATED ORAL at 08:25

## 2018-07-17 RX ADMIN — AMPICILLIN SODIUM AND SULBACTAM SODIUM 200 GRAM(S): 250; 125 INJECTION, POWDER, FOR SUSPENSION INTRAMUSCULAR; INTRAVENOUS at 17:14

## 2018-07-17 NOTE — PROGRESS NOTE ADULT - SUBJECTIVE AND OBJECTIVE BOX
Procedure: lap mookie  Surgeon: Georgi    S: Pt has no complaints. Denies CP, SOB, FIGUEROA, calf tenderness. Pain controlled with medication.    O:  T(C): 36.3 (07-17-18 @ 13:28), Max: 36.3 (07-17-18 @ 13:28)  T(F): 97.4 (07-17-18 @ 13:28), Max: 97.4 (07-17-18 @ 13:28)  HR: 75 (07-17-18 @ 13:28) (68 - 78)  BP: 110/71 (07-17-18 @ 13:28) (110/71 - 154/87)  RR: 16 (07-17-18 @ 13:28) (11 - 16)  SpO2: 99% (07-17-18 @ 13:28) (94% - 99%)  Wt(kg): --                        12.6   5.1   )-----------( 149      ( 17 Jul 2018 05:33 )             38.7     07-17    141  |  105  |  10  ----------------------------<  89  3.8   |  26  |  0.89    Ca    9.1      17 Jul 2018 05:35  Phos  2.9     07-17  Mg     2.1     07-17    TPro  5.8<L>  /  Alb  3.6  /  TBili  1.3<H>  /  DBili  x   /  AST  18  /  ALT  10  /  AlkPhos  91  07-17      Gen: NAD, resting comfortably in bed  C/V: NSR  Pulm: Nonlabored breathing, no respiratory distress  Abd: soft, NT/ND Incision: CDI  Extrem: WWP, no calf edema, SCDs in place      A/P: 72 y/o Female s/p Lap appy  Diet: CLD  IVF: LR  Pain/nausea control  DVT ppx: SCDs, SQH

## 2018-07-17 NOTE — BRIEF OPERATIVE NOTE - PROCEDURE
Laparoscopic cholecystectomy  07/17/2018    Active  ANTHONY <<-----Click on this checkbox to enter Procedure

## 2018-07-17 NOTE — PROGRESS NOTE ADULT - SUBJECTIVE AND OBJECTIVE BOX
SUBJECTIVE: Pt denies any complaints, pain well controlled. Denies N/V. NPO for OR today      MEDICATIONS  (STANDING):  ceFAZolin   IVPB 2000 milliGRAM(s) IV Intermittent every 8 hours  heparin  Injectable 5000 Unit(s) SubCutaneous every 8 hours  lactated ringers. 1000 milliLiter(s) (125 mL/Hr) IV Continuous <Continuous>  potassium chloride  10 mEq/100 mL IVPB 10 milliEquivalent(s) IV Intermittent once    MEDICATIONS  (PRN):  ondansetron Injectable 4 milliGRAM(s) IV Push every 6 hours PRN Nausea      Vital Signs Last 24 Hrs  T(C): 37 (2018 05:33), Max: 37 (2018 05:33)  T(F): 98.6 (2018 05:33), Max: 98.6 (2018 05:33)  HR: 54 (2018 05:33) (54 - 64)  BP: 103/64 (2018 05:33) (103/64 - 134/84)  BP(mean): --  RR: 16 (2018 05:33) (16 - 16)  SpO2: 95% (2018 05:33) (95% - 98%)    PHYSICAL EXAM:      Constitutional: A&Ox3    Respiratory: non labored breathing, no respiratory distress    Cardiovascular: NSR, RRR    Gastrointestinal: Soft ND,NT, no rebound or guarding     Genitourinary: voiding    Extremities: (-) edema                  I&O's Detail    2018 07:01  -  2018 07:00  --------------------------------------------------------  IN:    lactated ringers.: 1375 mL    Solution: 50 mL  Total IN: 1425 mL    OUT:    Voided: 450 mL  Total OUT: 450 mL    Total NET: 975 mL          LABS:                        12.6   5.1   )-----------( 149      ( 2018 05:33 )             38.7     07-17    141  |  105  |  10  ----------------------------<  89  3.8   |  26  |  0.89    Ca    9.1      2018 05:35  Phos  2.9     07-17  Mg     2.1     -17    TPro  5.8<L>  /  Alb  3.6  /  TBili  1.3<H>  /  DBili  x   /  AST  18  /  ALT  10  /  AlkPhos  91  07-17    PT/INR - ( 2018 19:09 )   PT: 11.7 sec;   INR: 1.05          PTT - ( 2018 19:09 )  PTT:29.1 sec  Urinalysis Basic - ( 2018 07:41 )    Color: Yellow / Appearance: Clear / S.020 / pH: x  Gluc: x / Ketone: NEGATIVE  / Bili: Negative / Urobili: 0.2 E.U./dL   Blood: x / Protein: NEGATIVE mg/dL / Nitrite: NEGATIVE   Leuk Esterase: Trace / RBC: 5-10 /HPF / WBC < 5 /HPF   Sq Epi: x / Non Sq Epi: 0-5 /HPF / Bacteria: Present /HPF        RADIOLOGY & ADDITIONAL STUDIES:

## 2018-07-17 NOTE — PROGRESS NOTE ADULT - ASSESSMENT
72 yo F relatively healthy found to have signs of acute cholecystitis on RUQ U/S with plans for OR today for lap mookie     pain/nausea control  IS/OOB  NPO/IVF  HSQ, SCDs  Ancef  OR today 7/17 for lap mookie 72 yo F relatively healthy found to have signs of acute cholecystitis on RUQ U/S with plans for OR today for lap mookie     pain/nausea control  IS/OOB  NPO/IVF  HSQ, SCDs  Unasyn  OR today 7/17 for lap mookie

## 2018-07-18 ENCOUNTER — TRANSCRIPTION ENCOUNTER (OUTPATIENT)
Age: 73
End: 2018-07-18

## 2018-07-18 VITALS
TEMPERATURE: 98 F | RESPIRATION RATE: 16 BRPM | OXYGEN SATURATION: 95 % | SYSTOLIC BLOOD PRESSURE: 119 MMHG | HEART RATE: 64 BPM | DIASTOLIC BLOOD PRESSURE: 68 MMHG

## 2018-07-18 LAB
ALBUMIN SERPL ELPH-MCNC: 3.6 G/DL — SIGNIFICANT CHANGE UP (ref 3.3–5)
ALP SERPL-CCNC: 88 U/L — SIGNIFICANT CHANGE UP (ref 40–120)
ALT FLD-CCNC: 42 U/L — SIGNIFICANT CHANGE UP (ref 10–45)
ANION GAP SERPL CALC-SCNC: 13 MMOL/L — SIGNIFICANT CHANGE UP (ref 5–17)
AST SERPL-CCNC: 68 U/L — HIGH (ref 10–40)
BILIRUB SERPL-MCNC: 1.3 MG/DL — HIGH (ref 0.2–1.2)
BUN SERPL-MCNC: 16 MG/DL — SIGNIFICANT CHANGE UP (ref 7–23)
CALCIUM SERPL-MCNC: 9.4 MG/DL — SIGNIFICANT CHANGE UP (ref 8.4–10.5)
CHLORIDE SERPL-SCNC: 103 MMOL/L — SIGNIFICANT CHANGE UP (ref 96–108)
CO2 SERPL-SCNC: 25 MMOL/L — SIGNIFICANT CHANGE UP (ref 22–31)
CREAT SERPL-MCNC: 1.04 MG/DL — SIGNIFICANT CHANGE UP (ref 0.5–1.3)
GLUCOSE SERPL-MCNC: 108 MG/DL — HIGH (ref 70–99)
HCT VFR BLD CALC: 37.9 % — SIGNIFICANT CHANGE UP (ref 34.5–45)
HGB BLD-MCNC: 12.5 G/DL — SIGNIFICANT CHANGE UP (ref 11.5–15.5)
MAGNESIUM SERPL-MCNC: 2 MG/DL — SIGNIFICANT CHANGE UP (ref 1.6–2.6)
MCHC RBC-ENTMCNC: 31.3 PG — SIGNIFICANT CHANGE UP (ref 27–34)
MCHC RBC-ENTMCNC: 33 G/DL — SIGNIFICANT CHANGE UP (ref 32–36)
MCV RBC AUTO: 95 FL — SIGNIFICANT CHANGE UP (ref 80–100)
PHOSPHATE SERPL-MCNC: 3.4 MG/DL — SIGNIFICANT CHANGE UP (ref 2.5–4.5)
PLATELET # BLD AUTO: 147 K/UL — LOW (ref 150–400)
POTASSIUM SERPL-MCNC: 4 MMOL/L — SIGNIFICANT CHANGE UP (ref 3.5–5.3)
POTASSIUM SERPL-SCNC: 4 MMOL/L — SIGNIFICANT CHANGE UP (ref 3.5–5.3)
PROT SERPL-MCNC: 5.9 G/DL — LOW (ref 6–8.3)
RBC # BLD: 3.99 M/UL — SIGNIFICANT CHANGE UP (ref 3.8–5.2)
RBC # FLD: 12.1 % — SIGNIFICANT CHANGE UP (ref 10.3–16.9)
SODIUM SERPL-SCNC: 141 MMOL/L — SIGNIFICANT CHANGE UP (ref 135–145)
WBC # BLD: 9.5 K/UL — SIGNIFICANT CHANGE UP (ref 3.8–10.5)
WBC # FLD AUTO: 9.5 K/UL — SIGNIFICANT CHANGE UP (ref 3.8–10.5)

## 2018-07-18 PROCEDURE — 74177 CT ABD & PELVIS W/CONTRAST: CPT

## 2018-07-18 PROCEDURE — 96375 TX/PRO/DX INJ NEW DRUG ADDON: CPT

## 2018-07-18 PROCEDURE — 85025 COMPLETE CBC W/AUTO DIFF WBC: CPT

## 2018-07-18 PROCEDURE — 88304 TISSUE EXAM BY PATHOLOGIST: CPT

## 2018-07-18 PROCEDURE — 99285 EMERGENCY DEPT VISIT HI MDM: CPT | Mod: 25

## 2018-07-18 PROCEDURE — 85027 COMPLETE CBC AUTOMATED: CPT

## 2018-07-18 PROCEDURE — 86901 BLOOD TYPING SEROLOGIC RH(D): CPT

## 2018-07-18 PROCEDURE — 85730 THROMBOPLASTIN TIME PARTIAL: CPT

## 2018-07-18 PROCEDURE — 84100 ASSAY OF PHOSPHORUS: CPT

## 2018-07-18 PROCEDURE — 83735 ASSAY OF MAGNESIUM: CPT

## 2018-07-18 PROCEDURE — 86850 RBC ANTIBODY SCREEN: CPT

## 2018-07-18 PROCEDURE — 93005 ELECTROCARDIOGRAM TRACING: CPT

## 2018-07-18 PROCEDURE — 76705 ECHO EXAM OF ABDOMEN: CPT

## 2018-07-18 PROCEDURE — 36415 COLL VENOUS BLD VENIPUNCTURE: CPT

## 2018-07-18 PROCEDURE — 86900 BLOOD TYPING SEROLOGIC ABO: CPT

## 2018-07-18 PROCEDURE — 85610 PROTHROMBIN TIME: CPT

## 2018-07-18 PROCEDURE — 71046 X-RAY EXAM CHEST 2 VIEWS: CPT

## 2018-07-18 PROCEDURE — 96374 THER/PROPH/DIAG INJ IV PUSH: CPT | Mod: XU

## 2018-07-18 PROCEDURE — 81001 URINALYSIS AUTO W/SCOPE: CPT

## 2018-07-18 PROCEDURE — 80053 COMPREHEN METABOLIC PANEL: CPT

## 2018-07-18 PROCEDURE — 83690 ASSAY OF LIPASE: CPT

## 2018-07-18 RX ORDER — ACETAMINOPHEN 500 MG
650 TABLET ORAL EVERY 6 HOURS
Qty: 0 | Refills: 0 | Status: DISCONTINUED | OUTPATIENT
Start: 2018-07-18 | End: 2018-07-18

## 2018-07-18 RX ADMIN — Medication 650 MILLIGRAM(S): at 01:30

## 2018-07-18 RX ADMIN — Medication 650 MILLIGRAM(S): at 00:52

## 2018-07-18 RX ADMIN — AMPICILLIN SODIUM AND SULBACTAM SODIUM 200 GRAM(S): 250; 125 INJECTION, POWDER, FOR SUSPENSION INTRAMUSCULAR; INTRAVENOUS at 05:45

## 2018-07-18 RX ADMIN — AMPICILLIN SODIUM AND SULBACTAM SODIUM 200 GRAM(S): 250; 125 INJECTION, POWDER, FOR SUSPENSION INTRAMUSCULAR; INTRAVENOUS at 12:23

## 2018-07-18 NOTE — PROGRESS NOTE ADULT - ASSESSMENT
74 yo F relatively healthy found to have signs of acute cholecystitis on RUQ U/S, S/p Lap Sally POD#1    pain/nausea control  IS/OOB  reg diet  HSQ, SCDs  am labs trend LFTs  d/c home when tolerating diet

## 2018-07-18 NOTE — DISCHARGE NOTE ADULT - HOSPITAL COURSE
74 yo F, presenting with 2 days of intermittent epigastric pain, concern for acute cholecystitis. Patient with hx of PUD most recent endoscopy 3 years ago with evidence of gastritis. She states that her first episode of abdominal pain was Friday evening, it was nonspecific with no N/V/ No diarrhea, no fevers or chills. Pain went away on its own without any intervention. She was in her usual state of her health until Monday early AM, when the epigastric pain woke her up, she felt nauseous and decided to come to ED. At this time patient says pain has resolved, and she feels "like her self"  Patient reports she has never had pain in this fashion before. No fever/chills, no vomiting. Has underwent tubal ligation, no other abdominal operations.   abdominal US and CT was performed suggestive of acute cholecystitis. Pt underwent lap mookie, procedure was uncomplicated. Post op course was uneventful. Pt tolerated PO intake, voided adequately and remained hemodynamically stable. At time of discharge pt was stable.

## 2018-07-18 NOTE — DISCHARGE NOTE ADULT - CARE PROVIDER_API CALL
Karthikeyan Laureano), Surgery  [address] [address]  Phone: [phone]  Fax: [phone]

## 2018-07-18 NOTE — DISCHARGE NOTE ADULT - MEDICATION SUMMARY - MEDICATIONS TO TAKE
I will START or STAY ON the medications listed below when I get home from the hospital:    Percocet 5/325 oral tablet  -- 1 tab(s) by mouth every 6 hours, As Needed -for severe pain MDD:4   -- Caution federal law prohibits the transfer of this drug to any person other  than the person for whom it was prescribed.  May cause drowsiness.  Alcohol may intensify this effect.  Use care when operating dangerous machinery.  This prescription cannot be refilled.  This product contains acetaminophen.  Do not use  with any other product containing acetaminophen to prevent possible liver damage.  Using more of this medication than prescribed may cause serious breathing problems.    -- Indication: For severe pain    meclizine 12.5 mg oral tablet  -- 1 tab(s) by mouth 3 times a day, As Needed  -- Indication: For Home med    omeprazole 20 mg oral delayed release capsule  -- 1 cap(s) by mouth once a day  -- Indication: For GERD (gastroesophageal reflux disease)

## 2018-07-18 NOTE — DISCHARGE NOTE ADULT - PLAN OF CARE
recovery Follow up with Dr. Laureano in 1-2 weeks. Call the office at the number below to schedule your appointment. You may shower; soap and water over incision sites. Do not scrub. Pat dry when done. No tub bathing or swimming until cleared. Keep incision sites out of the sun as scars will darken. Ambulate as tolerated, but no heavy lifting (>10lbs) or strenuous exercise. You may resume regular diet. You should be urinating at least 3-4x per day. Call the office if you experience increasing abdominal pain, nausea, vomiting, or temperature >101 F.

## 2018-07-18 NOTE — DISCHARGE NOTE ADULT - CARE PLAN
Principal Discharge DX:	Cholecystitis  Goal:	recovery  Assessment and plan of treatment:	Follow up with Dr. Laureano in 1-2 weeks. Call the office at the number below to schedule your appointment. You may shower; soap and water over incision sites. Do not scrub. Pat dry when done. No tub bathing or swimming until cleared. Keep incision sites out of the sun as scars will darken. Ambulate as tolerated, but no heavy lifting (>10lbs) or strenuous exercise. You may resume regular diet. You should be urinating at least 3-4x per day. Call the office if you experience increasing abdominal pain, nausea, vomiting, or temperature >101 F.

## 2018-07-18 NOTE — DISCHARGE NOTE ADULT - PATIENT PORTAL LINK FT
You can access the [facility] Portal, offered by [facility], by registering with the following website: [email or web address]

## 2018-07-18 NOTE — PROGRESS NOTE ADULT - SUBJECTIVE AND OBJECTIVE BOX
STATUS POST:  lap mookie    POST OPERATIVE DAY #:  1    SUBJECTIVE: Pt denies any complaints, tolerating diet without N/V. Admits to flatus. Pain well controlled    MEDICATIONS  (STANDING):  ampicillin/sulbactam  IVPB 3 Gram(s) IV Intermittent every 6 hours  ampicillin/sulbactam  IVPB 3 Gram(s) IV Intermittent once  heparin  Injectable 5000 Unit(s) SubCutaneous every 8 hours  lactated ringers. 1000 milliLiter(s) (100 mL/Hr) IV Continuous <Continuous>    MEDICATIONS  (PRN):  acetaminophen   Tablet. 650 milliGRAM(s) Oral every 6 hours PRN Moderate Pain (4 - 6)  ondansetron Injectable 4 milliGRAM(s) IV Push every 6 hours PRN Nausea      Vital Signs Last 24 Hrs  T(C): 36.8 (2018 05:22), Max: 37.2 (2018 20:29)  T(F): 98.2 (2018 05:22), Max: 99 (2018 20:29)  HR: 59 (2018 05:22) (56 - 81)  BP: 97/61 (2018 05:22) (91/55 - 154/87)  BP(mean): 107 (2018 13:00) (105 - 120)  RR: 16 (2018 05:22) (11 - 18)  SpO2: 94% (2018 05:22) (94% - 99%)    PHYSICAL EXAM:      Constitutional: A&Ox3    Respiratory: non labored breathing, no respiratory distress    Cardiovascular: NSR, RRR    Gastrointestinal: soft ND,NT                 Incision: CDI    Genitourinary: Voiding     Extremities: (-) edema                  I&O's Detail    2018 07:01  -  2018 07:00  --------------------------------------------------------  IN:    lactated ringers.: 800 mL    lactated ringers.: 125 mL    Oral Fluid: 120 mL    Other: 800 mL    Solution: 200 mL  Total IN: 2045 mL    OUT:    Voided: 1650 mL  Total OUT: 1650 mL    Total NET: 395 mL          LABS:                        12.6   5.1   )-----------( 149      ( 2018 05:33 )             38.7     07-17    141  |  105  |  10  ----------------------------<  89  3.8   |  26  |  0.89    Ca    9.1      2018 05:35  Phos  2.9       Mg     2.1         TPro  5.8<L>  /  Alb  3.6  /  TBili  1.3<H>  /  DBili  x   /  AST  18  /  ALT  10  /  AlkPhos  91  17    PT/INR - ( 2018 19:09 )   PT: 11.7 sec;   INR: 1.05          PTT - ( 2018 19:09 )  PTT:29.1 sec  Urinalysis Basic - ( 2018 07:41 )    Color: Yellow / Appearance: Clear / S.020 / pH: x  Gluc: x / Ketone: NEGATIVE  / Bili: Negative / Urobili: 0.2 E.U./dL   Blood: x / Protein: NEGATIVE mg/dL / Nitrite: NEGATIVE   Leuk Esterase: Trace / RBC: 5-10 /HPF / WBC < 5 /HPF   Sq Epi: x / Non Sq Epi: 0-5 /HPF / Bacteria: Present /HPF        RADIOLOGY & ADDITIONAL STUDIES:

## 2018-07-24 DIAGNOSIS — R10.9 UNSPECIFIED ABDOMINAL PAIN: ICD-10-CM

## 2018-07-24 DIAGNOSIS — K29.70 GASTRITIS, UNSPECIFIED, WITHOUT BLEEDING: ICD-10-CM

## 2018-07-24 DIAGNOSIS — K80.62 CALCULUS OF GALLBLADDER AND BILE DUCT WITH ACUTE CHOLECYSTITIS WITHOUT OBSTRUCTION: ICD-10-CM

## 2018-07-24 DIAGNOSIS — K46.9 UNSPECIFIED ABDOMINAL HERNIA WITHOUT OBSTRUCTION OR GANGRENE: ICD-10-CM

## 2018-07-24 DIAGNOSIS — M81.0 AGE-RELATED OSTEOPOROSIS WITHOUT CURRENT PATHOLOGICAL FRACTURE: ICD-10-CM

## 2018-07-24 DIAGNOSIS — R42 DIZZINESS AND GIDDINESS: ICD-10-CM

## 2018-07-24 DIAGNOSIS — K21.9 GASTRO-ESOPHAGEAL REFLUX DISEASE WITHOUT ESOPHAGITIS: ICD-10-CM

## 2018-07-24 DIAGNOSIS — K27.9 PEPTIC ULCER, SITE UNSPECIFIED, UNSPECIFIED AS ACUTE OR CHRONIC, WITHOUT HEMORRHAGE OR PERFORATION: ICD-10-CM

## 2018-07-24 DIAGNOSIS — G43.909 MIGRAINE, UNSPECIFIED, NOT INTRACTABLE, WITHOUT STATUS MIGRAINOSUS: ICD-10-CM

## 2018-07-24 DIAGNOSIS — K66.0 PERITONEAL ADHESIONS (POSTPROCEDURAL) (POSTINFECTION): ICD-10-CM

## 2018-07-24 LAB — SURGICAL PATHOLOGY STUDY: SIGNIFICANT CHANGE UP

## 2020-09-16 PROBLEM — K46.9 UNSPECIFIED ABDOMINAL HERNIA WITHOUT OBSTRUCTION OR GANGRENE: Chronic | Status: ACTIVE | Noted: 2018-07-16

## 2020-09-29 ENCOUNTER — APPOINTMENT (OUTPATIENT)
Dept: NEUROLOGY | Facility: CLINIC | Age: 75
End: 2020-09-29
Payer: MEDICARE

## 2020-09-29 VITALS
HEART RATE: 74 BPM | OXYGEN SATURATION: 97 % | DIASTOLIC BLOOD PRESSURE: 76 MMHG | TEMPERATURE: 97.7 F | SYSTOLIC BLOOD PRESSURE: 123 MMHG | WEIGHT: 149 LBS | BODY MASS INDEX: 26.4 KG/M2 | HEIGHT: 63 IN

## 2020-09-29 DIAGNOSIS — M54.2 CERVICALGIA: ICD-10-CM

## 2020-09-29 PROCEDURE — 99205 OFFICE O/P NEW HI 60 MIN: CPT

## 2020-10-09 ENCOUNTER — NON-APPOINTMENT (OUTPATIENT)
Age: 75
End: 2020-10-09

## 2020-11-19 ENCOUNTER — APPOINTMENT (OUTPATIENT)
Dept: NEUROLOGY | Facility: CLINIC | Age: 75
End: 2020-11-19
Payer: MEDICARE

## 2020-11-19 VITALS
HEIGHT: 63 IN | TEMPERATURE: 97.2 F | DIASTOLIC BLOOD PRESSURE: 85 MMHG | HEART RATE: 72 BPM | WEIGHT: 149 LBS | BODY MASS INDEX: 26.4 KG/M2 | OXYGEN SATURATION: 97 % | SYSTOLIC BLOOD PRESSURE: 125 MMHG

## 2020-11-19 PROCEDURE — 99215 OFFICE O/P EST HI 40 MIN: CPT

## 2020-12-11 ENCOUNTER — APPOINTMENT (OUTPATIENT)
Dept: NEUROLOGY | Facility: CLINIC | Age: 75
End: 2020-12-11
Payer: MEDICARE

## 2020-12-11 PROCEDURE — 99072 ADDL SUPL MATRL&STAF TM PHE: CPT

## 2020-12-11 PROCEDURE — 95816 EEG AWAKE AND DROWSY: CPT

## 2020-12-15 ENCOUNTER — APPOINTMENT (OUTPATIENT)
Dept: NEUROSURGERY | Facility: CLINIC | Age: 75
End: 2020-12-15
Payer: MEDICARE

## 2020-12-15 VITALS
TEMPERATURE: 94.5 F | RESPIRATION RATE: 18 BRPM | HEIGHT: 63 IN | DIASTOLIC BLOOD PRESSURE: 92 MMHG | OXYGEN SATURATION: 97 % | BODY MASS INDEX: 26.4 KG/M2 | SYSTOLIC BLOOD PRESSURE: 139 MMHG | HEART RATE: 88 BPM | WEIGHT: 149 LBS

## 2020-12-15 DIAGNOSIS — I73.9 PERIPHERAL VASCULAR DISEASE, UNSPECIFIED: ICD-10-CM

## 2020-12-15 PROCEDURE — 99205 OFFICE O/P NEW HI 60 MIN: CPT

## 2020-12-15 PROCEDURE — 99072 ADDL SUPL MATRL&STAF TM PHE: CPT

## 2020-12-15 RX ORDER — ACETAMINOPHEN, ASPIRIN, AND CAFFEINE 250; 250; 65 MG/1; MG/1; MG/1
TABLET, FILM COATED ORAL
Refills: 0 | Status: ACTIVE | COMMUNITY

## 2020-12-15 NOTE — ASSESSMENT
[FreeTextEntry1] : 75 years-old right-handed woman with history of migraine headaches, vertigo, GERD who started to experience neck pain in June 2020 after hyperextending the neck.  We reviewed an MRI/A of the brain and neck from October 2020 that didn’t show stroke, hemorrhage, stenosis or dissection.  There is small vessel ischemic disease.  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  She isn’t a cigarette smoker and doesn’t have family history of aneurysm.\par \par We discussed what an aneurysm is and the risks of rupture.  We will obtain a CTA of the head and neck for better visualization of the small aneurysm.  Given the size and lack of risk factors we will most likely continue to follow it conservatively.  She will follow up with us after the CTA.\par

## 2020-12-15 NOTE — REASON FOR VISIT
[Referred By: _________] : Patient was referred by LADONNA [FreeTextEntry1] : Prominent R cavernous ICA\par to evaluate prominence of R cavernous ICA

## 2020-12-15 NOTE — PHYSICAL EXAM
[General Appearance - Alert] : alert [General Appearance - In No Acute Distress] : in no acute distress [Oriented To Time, Place, And Person] : oriented to person, place, and time [Impaired Insight] : insight and judgment were intact [Affect] : the affect was normal [Person] : oriented to person [Place] : oriented to place [Time] : oriented to time [Cranial Nerves Optic (II)] : visual acuity intact bilaterally,  pupils equal round and reactive to light [Cranial Nerves Oculomotor (III)] : extraocular motion intact [Cranial Nerves Trigeminal (V)] : facial sensation intact symmetrically [Cranial Nerves Facial (VII)] : face symmetrical [Cranial Nerves Vestibulocochlear (VIII)] : hearing was intact bilaterally [Cranial Nerves Glossopharyngeal (IX)] : tongue and palate midline [Cranial Nerves Accessory (XI - Cranial And Spinal)] : head turning and shoulder shrug symmetric [Cranial Nerves Hypoglossal (XII)] : there was no tongue deviation with protrusion [Abnormal Walk] : normal gait [Neck Appearance] : the appearance of the neck was normal [] : no respiratory distress [Respiration, Rhythm And Depth] : normal respiratory rhythm and effort [Apical Impulse] : the apical impulse was normal [FreeTextEntry6] : 5/5 on all four extremeities

## 2020-12-15 NOTE — DATA REVIEWED
[de-identified] : MRI brain: no acute infarct, hemorrhage. Small vessel disease [de-identified] : MRa brain: small right MCA aneurysm

## 2020-12-15 NOTE — ADDENDUM
[FreeTextEntry1] : December 15, 2020\par \par Sonam Santillan MD\par [address]\par [address]th Floor\[place] [address]\par \par Patient’s name:  Varsha Conrad\par : 1945\par \par Dear Dr. Santillan:\par \par We met Varsha in our office at [facility].  As you know, she is a 75 years-old right-handed woman with history of migraine headaches, vertigo, GERD who started to experience neck pain in 2020 after hyperextending the neck.  We reviewed an MRI/A of the brain and neck from 2020 that didn’t show stroke, hemorrhage, stenosis or dissection.  There is small vessel ischemic disease.  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  She isn’t a cigarette smoker and doesn’t have family history of aneurysm.\par \par We discussed what an aneurysm is and the risks of rupture.  We will obtain a CTA of the head and neck for better visualization of the small aneurysm.  Given the size and lack of risk factors we will most likely continue to follow it conservatively.  She will follow up with us after the CTA.\par \par I will keep you updated at all times.  Thank you for allowing us to participate in Varsha’s care.\par \par Sincerely,\par \par \par Duran Sharpe MD\par Chief,\par Neuro-Endovascular Surgery and \par Interventional Neuroradiology \par [facility]\par [facility]\par [address]\par 3rd Floor\[place] [address]\par T:  [phone]\par F:  [phone]\par \par cc:	Ketih Pemberton MD\par 	[address]\[place] [address]\par \par 	Varsha Conrad\par 	 [address]\par 	[address]\[place] 	[place] [address]\par \par \par \par

## 2020-12-15 NOTE — HISTORY OF PRESENT ILLNESS
[de-identified] : 76 y/o RHF never smoker with a medical hx of GERD, vertigo, HAs/migraines (takes Exedrin) with recent neck pain in June 2020 2/2 hyperextension movement while gardening, right >left pain. 9/10/2020 MRI brain from  Radiology revealed asymmetric R ICA. An MRA neck was ordered by referring MD/Neurologist, Dr. Sonam Santillan, to r/o dissection, particularly given localizing symptom of R >L neck pain. \par \par MRA head and neck again demonstrates asymmetric R ICA without meagan aneurysm identified. Patient remained concerned about this finding and was referred to Francisca Sharpe to ensure this is a benign finding.\par \par She reports experiencing bilateral leg numbness two days after neck flexion. She denies focal weakness, double vision, blurry vision.\par \par She denies history of HTN and family history of cerebral aneurysm.\par \par Handedness: RIGHT\par \par Patient Address:\par [address] [place] [address], [address]\[place] [address]\par \par Referring MD:\par Dr. Sonam Santillan\par [address]\[place] [address]\par \par PCP:\par Dr. Keith Pemberton\par [address]\[place] [address]\par [phone]\par \par \par \par

## 2020-12-30 ENCOUNTER — OUTPATIENT (OUTPATIENT)
Dept: OUTPATIENT SERVICES | Facility: HOSPITAL | Age: 75
LOS: 1 days | End: 2020-12-30
Payer: MEDICARE

## 2020-12-30 ENCOUNTER — RESULT REVIEW (OUTPATIENT)
Age: 75
End: 2020-12-30

## 2020-12-30 ENCOUNTER — APPOINTMENT (OUTPATIENT)
Dept: CT IMAGING | Facility: HOSPITAL | Age: 75
End: 2020-12-30
Payer: MEDICARE

## 2020-12-30 DIAGNOSIS — Z98.51 TUBAL LIGATION STATUS: Chronic | ICD-10-CM

## 2020-12-30 DIAGNOSIS — Z98.89 OTHER SPECIFIED POSTPROCEDURAL STATES: Chronic | ICD-10-CM

## 2020-12-30 PROCEDURE — 70496 CT ANGIOGRAPHY HEAD: CPT | Mod: 26

## 2020-12-30 PROCEDURE — 70496 CT ANGIOGRAPHY HEAD: CPT

## 2020-12-30 PROCEDURE — 70498 CT ANGIOGRAPHY NECK: CPT

## 2020-12-30 PROCEDURE — 70498 CT ANGIOGRAPHY NECK: CPT | Mod: 26

## 2021-01-19 ENCOUNTER — APPOINTMENT (OUTPATIENT)
Dept: NEUROSURGERY | Facility: CLINIC | Age: 76
End: 2021-01-19
Payer: MEDICARE

## 2021-01-19 VITALS
DIASTOLIC BLOOD PRESSURE: 91 MMHG | HEART RATE: 75 BPM | RESPIRATION RATE: 17 BRPM | SYSTOLIC BLOOD PRESSURE: 154 MMHG | TEMPERATURE: 98.2 F | OXYGEN SATURATION: 98 %

## 2021-01-19 PROCEDURE — 99214 OFFICE O/P EST MOD 30 MIN: CPT

## 2021-01-19 PROCEDURE — 99072 ADDL SUPL MATRL&STAF TM PHE: CPT

## 2021-01-19 NOTE — PHYSICAL EXAM
[General Appearance - Alert] : alert [General Appearance - In No Acute Distress] : in no acute distress [Oriented To Time, Place, And Person] : oriented to person, place, and time [Impaired Insight] : insight and judgment were intact [Person] : oriented to person [Place] : oriented to place [Time] : oriented to time [Neck Appearance] : the appearance of the neck was normal [] : no respiratory distress [Respiration, Rhythm And Depth] : normal respiratory rhythm and effort

## 2021-01-19 NOTE — HISTORY OF PRESENT ILLNESS
[FreeTextEntry1] : 76 y/o RHF never smoker with a medical hx of GERD, vertigo, HAs/migraines (takes Exedrin) with recent neck pain in June 2020 2/2 hyperextension movement while gardening, right >left pain. 9/10/2020 MRI brain from  Radiology revealed asymmetric R ICA. An MRA neck was ordered by referring MD/Neurologist, Dr. Sonam Santillan, to r/o dissection, particularly given localizing symptom of R >L neck pain. \par \par MRA head and neck again demonstrates asymmetric R ICA without meagan aneurysm identified. Patient remained concerned about this finding and was referred to Francisca Sharpe to ensure this is a benign finding.\par \par She reports experiencing bilateral leg numbness two days after neck flexion. She denies focal weakness, double vision, blurry vision.\par \par She denies history of HTN and family history of cerebral aneurysm.\par \par Handedness: RIGHT\par \par Patient Address:\par [address] [place] [address], [address]\[place] [address]\par \par Referring MD:\par Dr. Sonam Santillan\par [address]\[place] [address]\par \par PCP:\par Dr. Keith Pemberton\par [address]\[place] [address]\par [phone]\par \par \par \par \par

## 2021-01-19 NOTE — ADDENDUM
[FreeTextEntry1] : 2021\par \par Sonam Santillan MD\par [address]\par [address] Floor\[place] [address]\par \par Patient’s name:  Varsha Conrad\par : 1945\par \par Dear Dr. Santillan:\par \par We saw Varsha in our office at [facility].  As you know, she is a 75 years-old right-handed woman with history of migraine headaches, vertigo, GERD who started to experience neck pain in 2020 after hyperextending the neck.  We had reviewed an MRI/A of the brain and neck from 2020 that didn’t show stroke, hemorrhage, stenosis or dissection.  There was small vessel ischemic disease.  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  She isn’t a cigarette smoker and doesn’t have family history of aneurysm.\par \par Today we reviewed a CTA of the head and neck from 2020.  There is no evidence of dissection.  There is a small (3 mm) wide necked aneurysmal dilatation of the right MCA bifurcation.  We discussed what an aneurysm is and the risks of rupture.  Given the size and lack of risk factors we will continue to follow it conservatively.  She will follow up with us after an MRA of the brain in 1 year.\par \par I will keep you updated at all times.  Thank you for allowing us to participate in Varsha’s care.\par \par Sincerely,\par \par \par Duran Sharpe MD\par Chief,\par Neuro-Endovascular Surgery and \par Interventional Neuroradiology \par [facility]\par [facility]\par [address]\par 3rd Floor\[place] [address]\par T:  [phone]\par F:  [phone]\par \par cc:	Keith Pemberton MD\par 	[address]\[place] 	[place] [address]\par \par 	Varsha Conrad\par 	 [address]\par 	[address]\[place] 	[place] [address]\par \par \par \par

## 2021-01-19 NOTE — REASON FOR VISIT
[Follow-Up: _____] : a [unfilled] follow-up visit [FreeTextEntry1] : LAST VISIT on 12/15/2020:\par  MRI/A of the brain and neck from October 2020  didn’t show stroke, hemorrhage, stenosis or dissection. There is small vessel ischemic disease. 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  Advised to obtain CTA head.\par \par \par TODAY'S VISIT:\par She still complains of daily holocranial headaches.

## 2021-01-19 NOTE — ASSESSMENT
[FreeTextEntry1] : CTA of the head and neck from December 30, 2020 showed no evidence of dissection.  There is a small (3 mm) wide necked aneurysmal dilatation of the right MCA bifurcation.  We discussed what an aneurysm is and the risks of rupture.  Given the size and lack of risk factors we will continue to follow it conservatively.  She will follow up with us after an MRA of the brain in 1 year.\par \par \par Plan:\par 1. Repeat MRA brain x 1 year.\par 2. Follow up with PCP regarding calcified thyroid nodule found on CTA neck.

## 2021-01-19 NOTE — DATA REVIEWED
[de-identified] : [facility]\par    [facility] Department of Radiology\par   Radiology Report\par \par \par Patient Name: JOSH OH   Report Date: 30-Dec-2020 16:09.00 \par Patient ID: [id number] ([id number]), [id number] ([id number])  Accession No.: [id number] \par Patient Birth Date: 1945  Report Status: F \par Referring Physician: [id number] MILA YEAGER   Reason For Study: CHECK FOR ANEURYSM  \par \par \par \par \par \par EXAM: CT ANGIO NECK (W)AW IC\par \par EXAM: CT ANGIO BRAIN (W)AW IC\par \par PROCEDURE DATE: 12/30/2020\par \par \par \par INTERPRETATION: PROCEDURE: CTA brain with intravenous contrast.\par \par INDICATION: Prominent R cavernous ICA to evaluate prominence of R cavernous ICA, rule out MCA aneurysm.\par \par TECHNIQUE: Multiple axial thin section were obtained through the [place] following the intravenous bolus injection of 80 ml Optiray-350. MIP series are provided.\par \par COMPARISON: None.\par \par FINDINGS: There is asymmetry in the size is of the internal carotid arteries with the right being larger. There is a normal bifurcation into the A1 and M1 segments. The left A1 segment is aplastic. The anterior communicating artery is patent. There is a approximately 3 mm outpouching arising from the right MCA bifurcation which projects laterally and is compatible with a aneurysm (series 4 image 588). There is a normal left left MCA trifurcation.\par \par The vertebral arteries, the basilar artery and both posterior cerebral arteries are patent. 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 Asymmetric internal carotid arteries with the right being larger.\par \par PROCEDURE: CTA neck with intravenous contrast.\par \par INDICATION: Rule out aneurysm.\par \par TECHNIQUE: Multiple axial thin section were obtained through the neck following the intravenous bolus injection of Optiray-350 as above. MIP series are provided.\par \par COMPARISON: None.\par \par FINDINGS:\par \par The aortic arch is normal size and shows patency, with normal 3 vessel configuration. Mild amount of plaque along the aortic arch.\par \par There is asymmetry in the size of the common carotid arteries with the right being larger. The common carotid arteries are otherwise normal in caliber.\par \par There is asymmetry in the size of the internal carotid arteries with the right being larger. There is no hemodynamically significant stenosis or occlusion.\par \par The cervical vertebral arteries are patent throughout, without hemodynamically significant stenosis or occlusion.\par \par Evaluation of the neck demonstrates a approximately 1.1 x 0.9 cm partially calcified left thyroid nodule. Consider further evaluation with thyroid ultrasound as clinically indicated.\par \par IMPRESSION: Asymmetric right common and internal carotid artery (right greater than left).\par \par \par \par \par Thank you for the opportunity to participate in the care of this patient.\par \par MIN DASILVA M.D., RADIOLOGY RESIDENT\par This document has been electronically signed.\par STEVEN AYALA MD; Attending Radiologist\par This document has been electronically signed. Dec 30 2020 4:09PM \par

## 2021-01-21 ENCOUNTER — APPOINTMENT (OUTPATIENT)
Dept: NEUROLOGY | Facility: CLINIC | Age: 76
End: 2021-01-21
Payer: MEDICARE

## 2021-01-21 VITALS
BODY MASS INDEX: 27.46 KG/M2 | DIASTOLIC BLOOD PRESSURE: 83 MMHG | HEART RATE: 75 BPM | OXYGEN SATURATION: 96 % | SYSTOLIC BLOOD PRESSURE: 132 MMHG | WEIGHT: 155 LBS | TEMPERATURE: 97.88 F | HEIGHT: 63 IN

## 2021-01-21 DIAGNOSIS — E04.1 NONTOXIC SINGLE THYROID NODULE: ICD-10-CM

## 2021-01-21 PROCEDURE — 99213 OFFICE O/P EST LOW 20 MIN: CPT

## 2021-01-21 PROCEDURE — 99072 ADDL SUPL MATRL&STAF TM PHE: CPT

## 2021-05-13 ENCOUNTER — APPOINTMENT (OUTPATIENT)
Dept: NEUROLOGY | Facility: CLINIC | Age: 76
End: 2021-05-13
Payer: MEDICARE

## 2021-05-13 VITALS
RESPIRATION RATE: 16 BRPM | OXYGEN SATURATION: 96 % | WEIGHT: 155 LBS | SYSTOLIC BLOOD PRESSURE: 126 MMHG | BODY MASS INDEX: 27.46 KG/M2 | DIASTOLIC BLOOD PRESSURE: 82 MMHG | HEIGHT: 63 IN | TEMPERATURE: 97.9 F | HEART RATE: 76 BPM

## 2021-05-13 PROCEDURE — 99072 ADDL SUPL MATRL&STAF TM PHE: CPT

## 2021-05-13 PROCEDURE — 99213 OFFICE O/P EST LOW 20 MIN: CPT

## 2021-07-29 ENCOUNTER — APPOINTMENT (OUTPATIENT)
Dept: NEUROLOGY | Facility: CLINIC | Age: 76
End: 2021-07-29

## 2021-08-10 ENCOUNTER — APPOINTMENT (OUTPATIENT)
Dept: OTOLARYNGOLOGY | Facility: CLINIC | Age: 76
End: 2021-08-10
Payer: MEDICARE

## 2021-08-10 VITALS
TEMPERATURE: 97.5 F | HEIGHT: 63 IN | WEIGHT: 155 LBS | BODY MASS INDEX: 27.46 KG/M2 | DIASTOLIC BLOOD PRESSURE: 86 MMHG | HEART RATE: 83 BPM | SYSTOLIC BLOOD PRESSURE: 123 MMHG

## 2021-08-10 PROCEDURE — 92557 COMPREHENSIVE HEARING TEST: CPT

## 2021-08-10 PROCEDURE — 92550 TYMPANOMETRY & REFLEX THRESH: CPT

## 2021-08-10 PROCEDURE — 92700 UNLISTED ORL SERVICE/PX: CPT

## 2021-08-10 PROCEDURE — 99204 OFFICE O/P NEW MOD 45 MIN: CPT

## 2021-08-10 NOTE — ASSESSMENT
[FreeTextEntry1] : 76F here for initial evaluation. For the past 2 months or so, she c/o 'hissing noise' with occasional itchy ears and also sensation of popping in her ears. This affects both ears, but the right one more so. There is no otorrhea, otalgia, tinnitus or vertigo. Last month she felt robust postnasal drip with bitter taste but that has since resolved and is is unsure if it is related. Audiogram from today shows essentially symmetric normal hearing sloping to moderately severe sensorineural hearing loss. Complete and comprehensive head and neck exam is unremarkable.\par Tinnitus due to underlying hearing loss which was discussed. Perhaps popping sensation is eustachian tube related? can try flonase. Either way, exam is normal and pt reassured. RTO as needed.\par

## 2021-08-10 NOTE — PHYSICAL EXAM
[de-identified] : Au: EAC clear, TM intact and mobile, ME clear [Midline] : trachea located in midline position [Normal] : no rashes

## 2021-08-10 NOTE — CONSULT LETTER
[Dear  ___] : Dear  [unfilled], [Courtesy Letter:] : I had the pleasure of seeing your patient, [unfilled], in my office today. [Consult Closing:] : Thank you very much for allowing me to participate in the care of this patient.  If you have any questions, please do not hesitate to contact me. [Sincerely,] : Sincerely, [FreeTextEntry3] : Martín Santos MD\par Department of Otolaryngology - Head and Neck Surgery\par [facility]

## 2021-08-10 NOTE — HISTORY OF PRESENT ILLNESS
[de-identified] : 76F here for initial evaluation.\par \par For the past 2 months or so, she c/o 'hissing noise' with occasional itchy ears and also sensation of popping in her ears. This affects both ears, but the right one more so. There is no otorrhea, otalgia, tinnitus or vertigo.\par Last month she felt robust postnasal drip with bitter taste but that has since resolved and is is unsure if it is related.\par \par Audiogram from today:\par essentially symmetric normal hearing sloping to moderately severe SNHL. \par R SRT 30, 100% discrim\par L SRT 25, 90% discrim\par type A tymps\par \par ROS otherwise unremarkable.

## 2021-08-19 ENCOUNTER — APPOINTMENT (OUTPATIENT)
Dept: NEUROLOGY | Facility: CLINIC | Age: 76
End: 2021-08-19
Payer: MEDICARE

## 2021-08-19 VITALS
RESPIRATION RATE: 16 BRPM | OXYGEN SATURATION: 95 % | DIASTOLIC BLOOD PRESSURE: 78 MMHG | TEMPERATURE: 98 F | WEIGHT: 146 LBS | HEIGHT: 63.5 IN | BODY MASS INDEX: 25.55 KG/M2 | HEART RATE: 75 BPM | SYSTOLIC BLOOD PRESSURE: 116 MMHG

## 2021-08-19 PROCEDURE — 99213 OFFICE O/P EST LOW 20 MIN: CPT

## 2021-12-28 ENCOUNTER — NON-APPOINTMENT (OUTPATIENT)
Age: 76
End: 2021-12-28

## 2021-12-28 ENCOUNTER — APPOINTMENT (OUTPATIENT)
Dept: NEUROSURGERY | Facility: CLINIC | Age: 76
End: 2021-12-28
Payer: MEDICARE

## 2021-12-28 PROCEDURE — 99442: CPT

## 2021-12-28 NOTE — HISTORY OF PRESENT ILLNESS
[Home] : at home, [unfilled] , at the time of the visit. [Medical Office: ([facility])___] : at the medical office located in  [Verbal consent obtained from patient] : the patient, [unfilled] [FreeTextEntry1] : \par \par RIGHT-HANDED 74 y/o RHF never smoker with a medical hx of GERD, vertigo, HAs/migraines (takes Exedrin) with recent neck pain in June 2020 after hyperextending the neck. An October 2020 MRI/A head didn’t show stroke, hemorrhage, stenosis or dissection. There was small vessel ischemic disease. 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 A CTA of the head and neck from December 30, 2020 showed no evidence of dissection. There is a small (3 mm) wide necked aneurysmal dilatation of the right MCA bifurcation.\par \par She denies history of HTN and family history of cerebral aneurysm.\par \par \par \par \par \par Handedness: RIGHT\par \par Patient Address:\par [address], [address]\[place] [address]\par \par Referring MD:\par Dr. Sonam Santillan\par [address]\[place] [address]\par \par PCP:\par Dr. Keith Pemberton\par [address]\[place] [address]\par [phone]\par \par \par \par \par \par \par \par MRA head and neck again demonstrates asymmetric R ICA without meagan aneurysm identified. Patient remained concerned about this finding and was referred to Dr. Sharpe to ensure this is a benign finding.\par \par She reports experiencing bilateral leg numbness two days after neck flexion. She denies focal weakness, double vision, blurry vision.\par \par She denies history of HTN and family history of cerebral aneurysm.\par \par Handedness: RIGHT\par \par Patient Address:\par [address], [address]\[place] [address]\par \par Referring MD:\par Dr. Sonam Santillan\par [address]\[place] [address]\par \par PCP:\par Dr. Keith Pemberton\par [address]\[place] [address]\par [phone]\par \par \par \par \par \par

## 2021-12-28 NOTE — ADDENDUM
[FreeTextEntry1] : 2021\par \par Sonam Santillan MD\par [address]\par [address]\[place] [address]\par \par Patient’s name:  Varsha Conrad\par : 1945\par \par Dear Dr. Santillan:\par \par Happy holidays! We spoke with Varsha in tele-health consultation at [facility].  As you know, she is a 76 years-old right-handed woman with history of migraine headaches, vertigo, GERD who started to experience neck pain in 2020 after hyperextending the neck.  We had reviewed an MRI/A of the brain and neck from 2020 that didn’t show stroke, hemorrhage, stenosis or dissection.  There was small vessel ischemic disease.  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  She isn’t a cigarette smoker and doesn’t have family history of aneurysm.\par \par Today we reviewed an MRA of the brain from 2021.  There is no evidence of dissection.  There is a small (3 mm) wide necked aneurysmal dilatation of the right MCA bifurcation that is unchanged when compared to the previous study.  She will follow up with us after an MRA of the brain in 1 year.\par \par I will keep you updated at all times.  Thank you for allowing us to participate in Varsha’s care.\par \par Sincerely,\par \par \par \par Duran Sharpe MD\par Chief\par Neuro-Endovascular Surgery and \par Interventional Neuroradiology\par [facility]\par \par [facility]\par [address]\par [address] Floor\[place] [address]\par T:  [phone]\par F:  [phone]\par \par cc:	Keith Pemberton MD\par 	[address]\[place] 	[place] [address]\par \par 	Varsha Conrad\par 	 [address]\par 	[address]\[place] 	[place] [address]\par \par \par \par \par

## 2021-12-28 NOTE — DATA REVIEWED
[de-identified] : 12/8/2021 MRA brain from  Radiology: Asymetric prominence of the cavernous segment of the right internal carotid artery, without significant chane when compared to the previous examination.

## 2021-12-28 NOTE — ASSESSMENT
[FreeTextEntry1] : Today we reviewed an MRA of the brain from December 8, 2021.  There is no evidence of dissection.  There is a small (3 mm) wide necked aneurysmal dilatation of the right MCA bifurcation that is unchanged when compared to the previous study.  She will follow up with us after an MRA of the brain in 1 year.\par \par \par Plan:\par 1. Repeat MRA brain x 1 year and RTO to review it.

## 2022-01-26 ENCOUNTER — APPOINTMENT (OUTPATIENT)
Dept: NEUROLOGY | Facility: CLINIC | Age: 77
End: 2022-01-26
Payer: MEDICARE

## 2022-01-26 VITALS
SYSTOLIC BLOOD PRESSURE: 121 MMHG | HEIGHT: 63.5 IN | WEIGHT: 142 LBS | TEMPERATURE: 97.6 F | HEART RATE: 84 BPM | BODY MASS INDEX: 24.85 KG/M2 | OXYGEN SATURATION: 96 % | DIASTOLIC BLOOD PRESSURE: 80 MMHG

## 2022-01-26 PROCEDURE — 99213 OFFICE O/P EST LOW 20 MIN: CPT

## 2022-01-26 RX ORDER — GABAPENTIN 300 MG/1
300 CAPSULE ORAL
Qty: 60 | Refills: 3 | Status: DISCONTINUED | COMMUNITY
Start: 2020-11-19 | End: 2022-01-26

## 2022-01-26 RX ORDER — GABAPENTIN 100 MG/1
100 CAPSULE ORAL
Qty: 30 | Refills: 3 | Status: DISCONTINUED | COMMUNITY
Start: 2020-09-29 | End: 2022-01-26

## 2022-03-08 ENCOUNTER — NON-APPOINTMENT (OUTPATIENT)
Age: 77
End: 2022-03-08

## 2022-03-08 ENCOUNTER — LABORATORY RESULT (OUTPATIENT)
Age: 77
End: 2022-03-08

## 2022-03-08 ENCOUNTER — APPOINTMENT (OUTPATIENT)
Dept: ENDOCRINOLOGY | Facility: CLINIC | Age: 77
End: 2022-03-08
Payer: MEDICARE

## 2022-03-08 VITALS
BODY MASS INDEX: 25.46 KG/M2 | WEIGHT: 146 LBS | HEART RATE: 73 BPM | SYSTOLIC BLOOD PRESSURE: 132 MMHG | DIASTOLIC BLOOD PRESSURE: 82 MMHG

## 2022-03-08 DIAGNOSIS — E55.9 VITAMIN D DEFICIENCY, UNSPECIFIED: ICD-10-CM

## 2022-03-08 DIAGNOSIS — L21.9 SEBORRHEIC DERMATITIS, UNSPECIFIED: ICD-10-CM

## 2022-03-08 PROCEDURE — 99205 OFFICE O/P NEW HI 60 MIN: CPT

## 2022-03-08 RX ORDER — GLUCOSA SU 2KCL/CHONDROITIN SU 500-400 MG
315-6.25 CAPSULE ORAL
Qty: 360 | Refills: 3 | Status: ACTIVE | COMMUNITY
Start: 2022-03-08 | End: 1900-01-01

## 2022-03-09 ENCOUNTER — NON-APPOINTMENT (OUTPATIENT)
Age: 77
End: 2022-03-09

## 2022-03-09 LAB
25(OH)D3 SERPL-MCNC: 38.2 NG/ML
ALBUMIN SERPL ELPH-MCNC: 4.1 G/DL
ALP BLD-CCNC: 112 U/L
ALT SERPL-CCNC: 12 U/L
ANION GAP SERPL CALC-SCNC: 11 MMOL/L
AST SERPL-CCNC: 22 U/L
BASOPHILS # BLD AUTO: 0.04 K/UL
BASOPHILS NFR BLD AUTO: 0.6 %
BILIRUB SERPL-MCNC: 0.8 MG/DL
BUN SERPL-MCNC: 14 MG/DL
CALCIUM SERPL-MCNC: 9.6 MG/DL
CALCIUM SERPL-MCNC: 9.6 MG/DL
CHLORIDE SERPL-SCNC: 107 MMOL/L
CO2 SERPL-SCNC: 25 MMOL/L
CREAT SERPL-MCNC: 0.93 MG/DL
EGFR: 64 ML/MIN/1.73M2
EOSINOPHIL # BLD AUTO: 0.2 K/UL
EOSINOPHIL NFR BLD AUTO: 3.1 %
GLUCOSE SERPL-MCNC: 83 MG/DL
HCT VFR BLD CALC: 41.7 %
HGB BLD-MCNC: 13.7 G/DL
IMM GRANULOCYTES NFR BLD AUTO: 0.3 %
LYMPHOCYTES # BLD AUTO: 2.15 K/UL
LYMPHOCYTES NFR BLD AUTO: 33.1 %
MAGNESIUM SERPL-MCNC: 2.3 MG/DL
MAN DIFF?: NORMAL
MCHC RBC-ENTMCNC: 32.2 PG
MCHC RBC-ENTMCNC: 32.9 GM/DL
MCV RBC AUTO: 98.1 FL
MONOCYTES # BLD AUTO: 0.57 K/UL
MONOCYTES NFR BLD AUTO: 8.8 %
NEUTROPHILS # BLD AUTO: 3.51 K/UL
NEUTROPHILS NFR BLD AUTO: 54.1 %
PARATHYROID HORMONE INTACT: 31 PG/ML
PHOSPHATE SERPL-MCNC: 3.4 MG/DL
PLATELET # BLD AUTO: 199 K/UL
POTASSIUM SERPL-SCNC: 4.5 MMOL/L
PROT SERPL-MCNC: 6.5 G/DL
RBC # BLD: 4.25 M/UL
RBC # FLD: 13 %
SODIUM SERPL-SCNC: 143 MMOL/L
TSH SERPL-ACNC: 1.24 UIU/ML
WBC # FLD AUTO: 6.49 K/UL

## 2022-03-11 LAB
ALBUMIN MFR SERPL ELPH: 58.3 %
ALBUMIN SERPL-MCNC: 3.8 G/DL
ALBUMIN/GLOB SERPL: 1.4 RATIO
ALPHA1 GLOB MFR SERPL ELPH: 4.3 %
ALPHA1 GLOB SERPL ELPH-MCNC: 0.3 G/DL
ALPHA2 GLOB MFR SERPL ELPH: 9.4 %
ALPHA2 GLOB SERPL ELPH-MCNC: 0.6 G/DL
B-GLOBULIN MFR SERPL ELPH: 11.4 %
B-GLOBULIN SERPL ELPH-MCNC: 0.7 G/DL
GAMMA GLOB FLD ELPH-MCNC: 1.1 G/DL
GAMMA GLOB MFR SERPL ELPH: 16.6 %
INTERPRETATION SERPL IEP-IMP: NORMAL
PROT SERPL-MCNC: 6.5 G/DL

## 2022-03-16 LAB
ALBUPE: 21 %
ALPHA1UPE: 34 %
ALPHA2UPE: 20.4 %
BETAUPE: 14.5 %
GAMMAUPE: 10.1 %
IGA 24H UR QL IFE: NORMAL
KAPPA LC 24H UR QL: NORMAL
PROT PATTERN 24H UR ELPH-IMP: NORMAL
PROT UR-MCNC: 22 MG/DL
PROT UR-MCNC: 22 MG/DL

## 2022-03-16 NOTE — ASSESSMENT
[FreeTextEntry1] : Osteoporosis. She has no history of fragility fracture. She did not tolerate previous oral bisphosphonate therapy. We discussed completion of a metabolic evaluation for secondary causes of bone loss. We discussed the potential benefits and risks of antiresorptive osteoporosis therapy at length, including but not limited to osteonecrosis of the jaw and atypical femoral fracture. She is amenable to therapy with zoledronic acid pending further evaluation and insurance authorization. The below instructions were given regarding zoledronic acid infusion. \par Metabolic evaluation for secondary causes of osteoporosis\par Calcium 1200 mg daily from diet and supplements (to be taken in divided doses as no more than 500-600 mg can be absorbed at one time); advised calcium citrate due to proton pump inhibitor use\par Continue current vitamin D regimen pending level\par Diet, exercise and fall prevention discussed\par \par Instructions for zoledronic acid:\par Drink at least 32 oz (~4 glasses) of water the day you have the infusion. If this is your first infusion, take acetaminophen (Tylenol) 500-1000 mg every 8 hours the day of and the day after your infusion, starting in the morning before coming to the hospital for your infusion. Be careful if you are taking other products with acetaminophen that you do not exceed the daily recommended dose. \par \par I reviewed the DXA performed on January 14, 2022 with the patient today.\par I reviewed the laboratories performed on August 6, 2021 with the patient today. \par I counseled the patient regarding calcium and vitamin D intake today.\par I discussed the following osteoporosis therapies: Alendronate, risedronate, ibandronate, zoledronic acid, denosumab\par \par CC:\par Dr. Keith Pemberton, Fax [phone]

## 2022-03-16 NOTE — PHYSICAL EXAM
[Alert] : alert [Healthy Appearance] : healthy appearance [No Acute Distress] : no acute distress [Normal Sclera/Conjunctiva] : normal sclera/conjunctiva [Normal Hearing] : hearing was normal [No Neck Mass] : no neck mass was observed [No LAD] : no lymphadenopathy [Supple] : the neck was supple [Thyroid Not Enlarged] : the thyroid was not enlarged [No Respiratory Distress] : no respiratory distress [Clear to Auscultation] : lungs were clear to auscultation bilaterally [Normal S1, S2] : normal S1 and S2 [Normal Rate] : heart rate was normal [Regular Rhythm] : with a regular rhythm [No Spinal Tenderness] : no spinal tenderness [No Stigmata of Cushings Syndrome] : no stigmata of Cushings Syndrome [Normal Gait] : normal gait [Normal Insight/Judgement] : insight and judgment were intact [Kyphosis] : no kyphosis present [Scoliosis] : no scoliosis [Acanthosis Nigricans] : no acanthosis nigricans [de-identified] : no moon facies, no supraclavicular fat pads [de-identified] : no difficulty balancing on one leg bilaterally

## 2022-03-16 NOTE — HISTORY OF PRESENT ILLNESS
[FreeTextEntry1] : Ms. Conrad is a 77 year-old woman presenting to establish care with me for osteoporosis. She has a history of thyroid nodules and has followed with an outside provider.\par \par Bone History\par Menopause: Age 51\par Osteoporosis diagnosed many years ago (no report available); most recent bone density as below\par Fracture history: Childhood right clavicle fracture\par Family history: Sister with history of hip fracture; no parental history of hip fracture\par Treatment: \par Risedronate 150 mg monthly for about four doses, last around 2012; discontinued due to body pain\par Ibandronate 150 mg monthly for about four doses, last around August 2021; discontinued due to body pain\par \par Falls: No\par Height loss: Perhaps 1-2 inches\par Kidney stones: No\par Dental health: Irregular appointments, no history of implants, no upcoming procedures planned\par Exercise: Walks at least 30 minutes most days\par Dairy intake: Occasional dairy\par Calcium supplements: 600 mg twice daily\par Multivitamin: None\par Vitamin D supplements: 1600 intl units in calcium supplement\par \par Osteoporosis risk factors include: Postmenopausal status,  race, prior fracture, falls, height loss, small thin bones, tobacco use, excessive alcohol, anorexia, family history, vitamin D deficiency, corticosteroid use, seizure medications, malabsorption, hyperparathyroidism, hyperthyroidism.\par NEGATIVE EXCEPT: Postmenopausal status, family history

## 2022-03-16 NOTE — ADDENDUM
[FreeTextEntry1] : Recent laboratory results as below, overall unremarkable for a secondary cause of bone loss to date. Random urine protein borderline elevated; electrophoresis without monoclonal proteins. We can proceed with zoledronic acid infusion. I left a telephone message for MsArpita Houston to discuss. 3/16/22

## 2022-03-16 NOTE — DATA REVIEWED
[FreeTextEntry1] : Laboratories (August 6, 2021) reviewed and significant for: \par Unremarkable complete blood \par Calcium 9.1 mg/dL (albumin 4.2 g/dL)\par BUN/creatinine 17/0.80 mg/dL (eGFR 73 mL/min)\par Alkaline phosphatase 87 U/L\par TSH 1.60 uIU/mL\par 25-hydroxyvitamin D 30 ng/mL\par \par Most recent bone mineral density\par Date: January 14, 2022\par Source: [place]\par Site: [facility]\par \par Site	BMD (g/cm2)	T-score	Change previous	Change baseline	\par Lumbar spine	0.878	-2.5\par Femoral neck (left)	0.710	-2.4	\par Femoral neck (right)0.705       -2.4\par Total hip (left)	0.721       -2.3\par Total hip (right)        0.705       -2.4          \par DXA Comments:

## 2022-03-25 ENCOUNTER — APPOINTMENT (OUTPATIENT)
Age: 77
End: 2022-03-25

## 2022-06-08 ENCOUNTER — APPOINTMENT (OUTPATIENT)
Dept: PULMONOLOGY | Facility: CLINIC | Age: 77
End: 2022-06-08
Payer: MEDICARE

## 2022-06-08 VITALS
BODY MASS INDEX: 25.55 KG/M2 | HEIGHT: 63.5 IN | TEMPERATURE: 97.7 F | HEART RATE: 68 BPM | SYSTOLIC BLOOD PRESSURE: 110 MMHG | DIASTOLIC BLOOD PRESSURE: 80 MMHG | WEIGHT: 146 LBS | OXYGEN SATURATION: 97 %

## 2022-06-08 DIAGNOSIS — Z01.818 ENCOUNTER FOR OTHER PREPROCEDURAL EXAMINATION: ICD-10-CM

## 2022-06-08 DIAGNOSIS — Z23 ENCOUNTER FOR IMMUNIZATION: ICD-10-CM

## 2022-06-08 DIAGNOSIS — R93.89 ABNORMAL FINDINGS ON DIAGNOSTIC IMAGING OF OTHER SPECIFIED BODY STRUCTURES: ICD-10-CM

## 2022-06-08 PROCEDURE — 99204 OFFICE O/P NEW MOD 45 MIN: CPT | Mod: GC

## 2022-06-08 NOTE — CONSULT LETTER
[Dear  ___] : Dear  [unfilled], [( Thank you for referring [unfilled] for consultation for _____ )] : Thank you for referring [unfilled] for consultation for [unfilled] [Please see my note below.] : Please see my note below. [Consult Closing:] : Thank you very much for allowing me to participate in the care of this patient.  If you have any questions, please do not hesitate to contact me. [Sincerely,] : Sincerely, [FreeTextEntry2] : Keith Pemberton MD\par [address] \[place] [address] [FreeTextEntry3] : Tita Lima MD, FCCP\par

## 2022-06-08 NOTE — HISTORY OF PRESENT ILLNESS
[Never] : never [TextBox_4] : 06/08/2022 [Kattih]: This is a pleasant 77F with history of uterine polyp and recent CT chest who was referred for CT chest findings by her primary care. Patient was planned for removal of polyp on uterus on 6/7 with Dr. Veronica Jacobs ([facility]), but abnormal CXR led to CT chest and therefore the procedure was deferred. Patient reports no pulmonary issues. Never smoker, no history of childhood pulmonary infections or asthma, no vaping. Exercise capacity is never limited. She had COVID in Apr 2022 and recovered uneventfully at home. COVID boosted. No history of anesthesia complications in the past and she has had multiple surgeries. No family history of autoimmune diseases, no personal history of autoimmune diseases, no family history of lung diseases. [ESS] : 1

## 2022-06-08 NOTE — PHYSICAL EXAM
[No Acute Distress] : no acute distress [Normal Oropharynx] : normal oropharynx [Normal Appearance] : normal appearance [No Neck Mass] : no neck mass [Normal Rate/Rhythm] : normal rate/rhythm [Normal S1, S2] : normal s1, s2 [No Murmurs] : no murmurs [No Resp Distress] : no resp distress [Clear to Auscultation Bilaterally] : clear to auscultation bilaterally [No Abnormalities] : no abnormalities [Benign] : benign [Normal Gait] : normal gait [No Clubbing] : no clubbing [No Cyanosis] : no cyanosis [Normal Color/ Pigmentation] : normal color/ pigmentation [No Focal Deficits] : no focal deficits [Oriented x3] : oriented x3 [Normal Affect] : normal affect

## 2022-06-08 NOTE — ASSESSMENT
[FreeTextEntry1] : Data reviewed:\par \par CXR [facility] personally reviewed: Bilateral reticular markings\par \par CT chest 5/26/22 [facility] personally reviewed: Bilateral reticular changes in peripherally, no cranial caudal gradient, pleural thickening in R lower lung base, 12 mm nodule in RLL and 6mm RUL pleural based nodule\par \par Impression:\par Lung nodules in a never smoker\par Peripheral reticulations\par Pre operative examination\par \par Plan:\par Patient has no pulmonary issues and is optimized from a pulmonary perspective for her uterine polyp procedure, date to be scheduled. For her pulmonary nodules, she is a never smoker and is therefore low risk- will plan to repeat CT chest in 12 months for surveillance. For reticular changes, she is asymptomatic but will obtain PFTs now and in 1 year. She will call us back to schedule PFTs after she has scheduled her surgery.\par \par Needs pneumonia vaccine, will give her Prevnar 20 at next visit. Return after repeat CT chest in 1 year.\par --\par Attending Addendum\par \par Seen and examined by me and agree w above. Asymptomatic interstitial abnormality on scan as well as small nodules. No pulmonary contraindication to the needed surgery, but will obtain baseline PFT, and repeat PFT and CT in one year.\par

## 2022-06-26 ENCOUNTER — NON-APPOINTMENT (OUTPATIENT)
Age: 77
End: 2022-06-26

## 2022-08-11 ENCOUNTER — APPOINTMENT (OUTPATIENT)
Dept: OTOLARYNGOLOGY | Facility: CLINIC | Age: 77
End: 2022-08-11

## 2022-08-11 VITALS
WEIGHT: 146 LBS | HEART RATE: 76 BPM | TEMPERATURE: 96.8 F | DIASTOLIC BLOOD PRESSURE: 92 MMHG | SYSTOLIC BLOOD PRESSURE: 130 MMHG | BODY MASS INDEX: 25.87 KG/M2 | HEIGHT: 63 IN

## 2022-08-11 PROCEDURE — 99214 OFFICE O/P EST MOD 30 MIN: CPT

## 2022-08-11 NOTE — HISTORY OF PRESENT ILLNESS
[de-identified] : 77F here in followup.\par \par Around 1 month ago she developed left sided ear discomfort. This first started in front of and outside her ear then it also involved inside the ear canal. At that time the pain was constant without triggers. She went to urgent care who gave her flonase which she has been taking. Since then, the pain has slowly improved and now is nearly completely resolved. She thinks the pain is made worse with chewing and yawning.\par \par ROS otherwise unremarkable.

## 2022-08-11 NOTE — CONSULT LETTER
[Dear  ___] : Dear  [unfilled], [Courtesy Letter:] : I had the pleasure of seeing your patient, [unfilled], in my office today. [Consult Closing:] : Thank you very much for allowing me to participate in the care of this patient.  If you have any questions, please do not hesitate to contact me. [Sincerely,] : Sincerely, [FreeTextEntry3] : Martín Santos MD\par Department of Otolaryngology - Head and Neck Surgery\par [facility]

## 2022-08-11 NOTE — PHYSICAL EXAM
[de-identified] : no pain, mass or lesion [de-identified] : Au: EAC clear, TM intact and mobile, ME clear [Midline] : trachea located in midline position [Normal] : no rashes

## 2022-08-11 NOTE — DATA REVIEWED
[de-identified] : Audiogram 8/2021:\par Audiogram from today:\par essentially symmetric normal hearing sloping to moderately severe SNHL. \par R SRT 30, 100% discrim\par L SRT 25, 90% discrim\par type A tymps\par

## 2022-08-11 NOTE — ASSESSMENT
[FreeTextEntry1] : 77F here in followup. Around 1 month ago she developed left sided ear discomfort. This first started in front of and outside her ear then it also involved inside the ear canal. At that time the pain was constant without triggers. She went to urgent care who gave her flonase which she has been taking. Since then, the pain has slowly improved and now is nearly completely resolved. She thinks the pain is made worse with chewing and yawning. She does not grind her teeth. Complete and comprehensive head and neck exam is unremarkable.\par Prior left otalgia likely due to TMJ related pain given history. It is nearly resolved at this time. For now, would recommend soft diet, massage, NSAIDs as needed. RTO should sx return.

## 2022-08-17 NOTE — ASU PATIENT PROFILE, ADULT - NSICDXPASTMEDICALHX_GEN_ALL_CORE_FT
PAST MEDICAL HISTORY:  Diverticulitis     GERD (gastroesophageal reflux disease)     Hernia     Migraines     Osteoporosis     Vertigo      PAST MEDICAL HISTORY:  Brain aneurysm FOLLOWS UP WITH ANNUAL MRIS    Brain aneurysm     Diverticulitis     GERD (gastroesophageal reflux disease)     H/O tinnitus     Hernia     Migraines     Osteoporosis     Vertigo

## 2022-08-17 NOTE — ASU PATIENT PROFILE, ADULT - NS PREOP UNDERSTANDS INFO
No solid food, dairy, candy or gum after 11:30PM tonight, water is allowed before 06:00am tomorrow. Pt. to bring photo ID, vaccination and insurance card; dress in comfortable clothes; no jewelries; contact lens;  no smoking/alcohol drinking/illicit drug use; escort must show proof of vaccination and photo ID. Address and call back number given./yes

## 2022-08-17 NOTE — ASU PATIENT PROFILE, ADULT - NSICDXPASTSURGICALHX_GEN_ALL_CORE_FT
PAST SURGICAL HISTORY:  H/O tubal ligation     History of D&C      PAST SURGICAL HISTORY:  H/O tubal ligation     History of cholecystectomy     History of D&C

## 2022-08-17 NOTE — ASU PATIENT PROFILE, ADULT - VISION (WITH CORRECTIVE LENSES IF THE PATIENT USUALLY WEARS THEM):
Uses glasses for reading/Partially impaired: cannot see medication labels or newsprint, but can see obstacles in path, and the surrounding layout; can count fingers at arm's length

## 2022-08-18 ENCOUNTER — TRANSCRIPTION ENCOUNTER (OUTPATIENT)
Age: 77
End: 2022-08-18

## 2022-08-19 ENCOUNTER — TRANSCRIPTION ENCOUNTER (OUTPATIENT)
Age: 77
End: 2022-08-19

## 2022-08-19 ENCOUNTER — RESULT REVIEW (OUTPATIENT)
Age: 77
End: 2022-08-19

## 2022-08-19 ENCOUNTER — OUTPATIENT (OUTPATIENT)
Dept: OUTPATIENT SERVICES | Facility: HOSPITAL | Age: 77
LOS: 1 days | Discharge: HOME | End: 2022-08-19

## 2022-08-19 VITALS
RESPIRATION RATE: 16 BRPM | SYSTOLIC BLOOD PRESSURE: 154 MMHG | TEMPERATURE: 97 F | DIASTOLIC BLOOD PRESSURE: 87 MMHG | HEIGHT: 63 IN | WEIGHT: 143.3 LBS | OXYGEN SATURATION: 98 % | HEART RATE: 74 BPM

## 2022-08-19 VITALS
HEART RATE: 66 BPM | DIASTOLIC BLOOD PRESSURE: 69 MMHG | OXYGEN SATURATION: 96 % | SYSTOLIC BLOOD PRESSURE: 117 MMHG | RESPIRATION RATE: 14 BRPM

## 2022-08-19 DIAGNOSIS — Z98.51 TUBAL LIGATION STATUS: Chronic | ICD-10-CM

## 2022-08-19 DIAGNOSIS — Z90.49 ACQUIRED ABSENCE OF OTHER SPECIFIED PARTS OF DIGESTIVE TRACT: Chronic | ICD-10-CM

## 2022-08-19 DIAGNOSIS — Z98.89 OTHER SPECIFIED POSTPROCEDURAL STATES: Chronic | ICD-10-CM

## 2022-08-19 PROCEDURE — 88305 TISSUE EXAM BY PATHOLOGIST: CPT | Mod: 26

## 2022-08-19 DEVICE — MYOSURE TISSUE REMOVAL DEVICE LITE: Type: IMPLANTABLE DEVICE | Status: FUNCTIONAL

## 2022-08-19 RX ORDER — ACETAMINOPHEN 500 MG
1 TABLET ORAL
Qty: 0 | Refills: 0 | DISCHARGE

## 2022-08-19 RX ORDER — KETOROLAC TROMETHAMINE 30 MG/ML
30 SYRINGE (ML) INJECTION ONCE
Refills: 0 | Status: DISCONTINUED | OUTPATIENT
Start: 2022-08-19 | End: 2022-08-19

## 2022-08-19 RX ORDER — SODIUM CHLORIDE 9 MG/ML
1000 INJECTION, SOLUTION INTRAVENOUS
Refills: 0 | Status: DISCONTINUED | OUTPATIENT
Start: 2022-08-19 | End: 2022-08-19

## 2022-08-19 RX ORDER — FENTANYL CITRATE 50 UG/ML
25 INJECTION INTRAVENOUS
Refills: 0 | Status: DISCONTINUED | OUTPATIENT
Start: 2022-08-19 | End: 2022-08-19

## 2022-08-19 RX ORDER — CHOLECALCIFEROL (VITAMIN D3) 125 MCG
1 CAPSULE ORAL
Qty: 0 | Refills: 0 | DISCHARGE

## 2022-08-19 RX ORDER — ACETAMINOPHEN 500 MG
1000 TABLET ORAL ONCE
Refills: 0 | Status: COMPLETED | OUTPATIENT
Start: 2022-08-19 | End: 2022-08-19

## 2022-08-19 RX ADMIN — Medication 1000 MILLIGRAM(S): at 08:47

## 2022-08-19 NOTE — ASU DISCHARGE PLAN (ADULT/PEDIATRIC) - ACTIVITY LEVEL
until cleared by your doctor/Nothing per rectum/Nothing per vagina/No tub baths/No douching/No tampons/No intercourse

## 2022-08-19 NOTE — ASU DISCHARGE PLAN (ADULT/PEDIATRIC) - NS MD DC FALL RISK RISK
For information on Fall & Injury Prevention, visit: [email or web address] OR  [email or web address] OR  https://www.cdc.gov/steadi/patient.html

## 2022-08-19 NOTE — BRIEF OPERATIVE NOTE - NSICDXBRIEFPROCEDURE_GEN_ALL_CORE_FT
PROCEDURES:  Hysteroscopy 19-Aug-2022 10:38:03  Shyam Pozo  Polypectomy, uterine 19-Aug-2022 10:38:33  Shyam Pozo  Dilation and curettage, uterus 19-Aug-2022 10:38:49  Shyam Pozo

## 2022-08-19 NOTE — PRE-ANESTHESIA EVALUATION ADULT - NSANTHAIRWAYFT_ENT_ALL_CORE
Midline trachea.  Adequate oral aperture.  Adequate mento-hyoid distance. Cervical range of motion adequate.

## 2022-08-22 DIAGNOSIS — N84.0 POLYP OF CORPUS UTERI: ICD-10-CM

## 2022-08-22 LAB — SURGICAL PATHOLOGY STUDY: SIGNIFICANT CHANGE UP

## 2022-09-21 PROBLEM — Z86.69 PERSONAL HISTORY OF OTHER DISEASES OF THE NERVOUS SYSTEM AND SENSE ORGANS: Chronic | Status: ACTIVE | Noted: 2022-08-19

## 2022-09-21 PROBLEM — I67.1 CEREBRAL ANEURYSM, NONRUPTURED: Chronic | Status: ACTIVE | Noted: 2022-08-19

## 2022-09-21 PROBLEM — K57.92 DIVERTICULITIS OF INTESTINE, PART UNSPECIFIED, WITHOUT PERFORATION OR ABSCESS WITHOUT BLEEDING: Chronic | Status: ACTIVE | Noted: 2022-08-17

## 2022-09-29 ENCOUNTER — APPOINTMENT (OUTPATIENT)
Dept: NEPHROLOGY | Facility: CLINIC | Age: 77
End: 2022-09-29
Payer: MEDICARE

## 2022-09-29 VITALS
SYSTOLIC BLOOD PRESSURE: 152 MMHG | BODY MASS INDEX: 26.22 KG/M2 | HEART RATE: 80 BPM | WEIGHT: 148 LBS | HEIGHT: 63 IN | DIASTOLIC BLOOD PRESSURE: 94 MMHG

## 2022-09-29 DIAGNOSIS — Z87.19 PERSONAL HISTORY OF OTHER DISEASES OF THE DIGESTIVE SYSTEM: ICD-10-CM

## 2022-09-29 DIAGNOSIS — K21.9 GASTRO-ESOPHAGEAL REFLUX DISEASE W/OUT ESOPHAGITIS: ICD-10-CM

## 2022-09-29 DIAGNOSIS — M81.0 AGE-RELATED OSTEOPOROSIS W/OUT CURRENT PATHOLOGICAL FRACTURE: ICD-10-CM

## 2022-09-29 DIAGNOSIS — Z82.49 FAMILY HISTORY OF ISCHEMIC HEART DISEASE AND OTHER DISEASES OF THE CIRCULATORY SYSTEM: ICD-10-CM

## 2022-09-29 DIAGNOSIS — Z83.3 FAMILY HISTORY OF DIABETES MELLITUS: ICD-10-CM

## 2022-09-29 DIAGNOSIS — Z86.69 PERSONAL HISTORY OF OTHER DISEASES OF THE NERVOUS SYSTEM AND SENSE ORGANS: ICD-10-CM

## 2022-09-29 DIAGNOSIS — R31.29 OTHER MICROSCOPIC HEMATURIA: ICD-10-CM

## 2022-09-29 PROCEDURE — 93784 AMBL BP MNTR W/SOFTWARE: CPT

## 2022-09-29 PROCEDURE — 99204 OFFICE O/P NEW MOD 45 MIN: CPT | Mod: 25

## 2022-09-29 NOTE — ASSESSMENT
[FreeTextEntry1] : 77-year-old woman with apparent USMAN -possible factors may include use of NSAIDs, use of PPIs, exposure to radiocontrast although that was several months ago.  In addition, her BP is elevated today, and its not clear whether that is whitecoat effect or real.  I have asked her to schedule an ultrasound of kidneys and bladder to assess kidney size, echogenicity, and rule out obstructive uropathy.  I have also ordered labs to include UA, U ACR, BMP, Cystatin C, PTH.  In addition, I have asked her to wear a 24-hour ambulatory BP recorder to assess more accurately.  In the face of a small aneurysm, it is critical that if hypertension is confirmed, we need to lower the BP.  I also asked her to try to avoid all NSAIDs, and minimize use of PPIs.

## 2022-09-29 NOTE — CONSULT LETTER
[Dear  ___] : Dear  [unfilled], [Consult Letter:] : I had the pleasure of evaluating your patient, [unfilled]. [Please see my note below.] : Please see my note below. [Consult Closing:] : Thank you very much for allowing me to participate in the care of this patient.  If you have any questions, please do not hesitate to contact me. [Sincerely,] : Sincerely, [FreeTextEntry2] : Dr Pemberton [FreeTextEntry3] : Sincerely, \par \par Raghu Hastings MD, FACP

## 2022-09-29 NOTE — HISTORY OF PRESENT ILLNESS
[FreeTextEntry1] : 77-year-old woman referred by Dr. Pemberton because of the sudden rise in creatinine from 0.77 up to 1.1 in the last 4 months, with GFR down to 52, K4.7, CO2 30, calcium 9.7, hemoglobin 14.2.  She has a history of GERD, and takes a PPI daily, omeprazole 20 mg.  She has a history of microscopic hematuria on and off for years, with a negative cystoscopy several years ago.  She had a CAT scan of the abdomen and pelvis with contrast in January, and was described as having mild bilateral hydronephrosis with a faint calcification in the left lower pole of questionable significance.  Her bladder was distended with a volume of 412 cc.  She takes occasional NSAIDs in the form of ibuprofen for back pain.  Her BP has been normal on virtually every visit, and was 118/81 2 weeks ago with Dr. DAHL.  However it was 130/92 on an ENT visit with left ear pain in August.  It was 132/82 on a March visit for osteoporosis.  She is thought to have a small cerebral aneurysm.  Her BP was 110/80 on a pulmonary visit in June.

## 2022-10-13 NOTE — PRE-OP CHECKLIST - SURGICAL CONSENT
----- Message from Alex Lynch MA sent at 10/13/2022 10:13 AM CDT -----    ----- Message -----  From: Delfina Sanchez  Sent: 10/13/2022   9:03 AM CDT  To: Abebe Baugh Staff    Type:  Pharmacy Calling to Clarify an RX    Name of Caller:     Pharmacy Name:        [facility] [place] - [address]  [address]  [place] [address]  Phone: [phone] Fax: [phone]      Prescription Name: furosemide (LASIX) 20 MG tablet    What do they need to clarify?  Asking for a refill     Can you be contacted via [facility]?    Best Call Back Number:     Additional Information:           done

## 2022-11-23 ENCOUNTER — APPOINTMENT (OUTPATIENT)
Dept: NEUROLOGY | Facility: CLINIC | Age: 77
End: 2022-11-23

## 2022-11-23 ENCOUNTER — NON-APPOINTMENT (OUTPATIENT)
Age: 77
End: 2022-11-23

## 2022-11-23 VITALS
SYSTOLIC BLOOD PRESSURE: 124 MMHG | WEIGHT: 141 LBS | BODY MASS INDEX: 24.98 KG/M2 | TEMPERATURE: 96.7 F | HEIGHT: 63 IN | DIASTOLIC BLOOD PRESSURE: 79 MMHG | HEART RATE: 70 BPM | OXYGEN SATURATION: 98 %

## 2022-11-23 PROCEDURE — 99213 OFFICE O/P EST LOW 20 MIN: CPT

## 2022-11-23 RX ORDER — MV-MN/C/THEANINE/HERB NO.310 1000-200MG
POWDER IN PACKET (EA) ORAL
Refills: 0 | Status: DISCONTINUED | COMMUNITY
End: 2022-11-23

## 2022-11-23 RX ORDER — PSYLLIUM HUSK 0.4 G
CAPSULE ORAL
Refills: 0 | Status: DISCONTINUED | COMMUNITY
End: 2022-11-23

## 2022-12-19 ENCOUNTER — APPOINTMENT (OUTPATIENT)
Dept: NEPHROLOGY | Facility: CLINIC | Age: 77
End: 2022-12-19

## 2022-12-19 VITALS
SYSTOLIC BLOOD PRESSURE: 126 MMHG | HEART RATE: 72 BPM | DIASTOLIC BLOOD PRESSURE: 77 MMHG | HEIGHT: 63 IN | BODY MASS INDEX: 24.98 KG/M2 | WEIGHT: 141 LBS

## 2022-12-19 DIAGNOSIS — Z87.39 PERSONAL HISTORY OF OTHER DISEASES OF THE MUSCULOSKELETAL SYSTEM AND CONNECTIVE TISSUE: ICD-10-CM

## 2022-12-19 DIAGNOSIS — K76.0 FATTY (CHANGE OF) LIVER, NOT ELSEWHERE CLASSIFIED: ICD-10-CM

## 2022-12-19 PROCEDURE — 99214 OFFICE O/P EST MOD 30 MIN: CPT

## 2022-12-19 NOTE — CONSULT LETTER
[Dear  ___] : Dear  [unfilled], [Consult Letter:] : I had the pleasure of evaluating your patient, [unfilled]. [Please see my note below.] : Please see my note below. [Consult Closing:] : Thank you very much for allowing me to participate in the care of this patient.  If you have any questions, please do not hesitate to contact me. [Sincerely,] : Sincerely, [FreeTextEntry2] : Dr medellin [FreeTextEntry3] : Sincerely, \par \par Raghu Hastings MD, FACP\par

## 2022-12-19 NOTE — HISTORY OF PRESENT ILLNESS
[FreeTextEntry1] : 77-year-old woman referred by Dr. Pemberton because of a sudden rise in creatinine from 0.77 up to 1.1 in 4 months and a concomitant drop in GFR to 52.  She has a history of GERD, treated with a PPI.  She has a history of microscopic hematuria on and off for years, with a negative cystoscopy.  She had a CAT scan of the abdomen and pelvis with contrast, and was described as having mild bilateral hydronephrosis with a faint calcification in the left lower pole.  She takes occasional NSAIDs in the form of ibuprofen for back pain.  Her BP has occasionally been minimally elevated.  She has a history of osteoporosis and is also thought to have a small cerebral aneurysm followed by neurology.  I ordered a renal ultrasound, which showed bilateral nonobstructing renal stones, measuring up to 3 mm.  There was no hydronephrosis and her postvoid residual volume was 43 cc.  She is on vitamin D 3 1000 units daily.  I asked her to collect a Litholink 24-hour urine, which showed only 1.15 L of urine, citrate of 869, calcium 201, uric acid 0.64.

## 2022-12-19 NOTE — ASSESSMENT
[FreeTextEntry1] : 77-year-old woman with bilateral kidney stones, whose only main risk factor for stone forming is low urine volume -I encouraged her strongly to increase her fluid intake dramatically.  Her urine calcium was not dramatic, but high enough to make me suggest avoiding calcium supplements, and reducing vitamin D3 to every other day.  Renal function has returned to normal, with a BUN of 15, creatinine 0.91, GFR of 66, which is normal for her age, K4.1, hemoglobin 14.3.  She will return in 1 year unless complications dictate an earlier visit.

## 2023-01-18 ENCOUNTER — NON-APPOINTMENT (OUTPATIENT)
Age: 78
End: 2023-01-18

## 2023-02-09 ENCOUNTER — APPOINTMENT (OUTPATIENT)
Dept: OTOLARYNGOLOGY | Facility: CLINIC | Age: 78
End: 2023-02-09

## 2023-03-07 ENCOUNTER — APPOINTMENT (OUTPATIENT)
Dept: NEUROSURGERY | Facility: CLINIC | Age: 78
End: 2023-03-07
Payer: MEDICARE

## 2023-03-07 ENCOUNTER — NON-APPOINTMENT (OUTPATIENT)
Age: 78
End: 2023-03-07

## 2023-03-07 VITALS
WEIGHT: 148 LBS | HEART RATE: 76 BPM | HEIGHT: 63 IN | BODY MASS INDEX: 26.22 KG/M2 | RESPIRATION RATE: 17 BRPM | OXYGEN SATURATION: 98 % | SYSTOLIC BLOOD PRESSURE: 128 MMHG | TEMPERATURE: 97.3 F | DIASTOLIC BLOOD PRESSURE: 83 MMHG

## 2023-03-07 PROCEDURE — 99213 OFFICE O/P EST LOW 20 MIN: CPT

## 2023-03-07 RX ORDER — FAMOTIDINE 40 MG/1
40 TABLET, FILM COATED ORAL
Refills: 0 | Status: ACTIVE | COMMUNITY

## 2023-03-07 RX ORDER — CYANOCOBALAMIN (VITAMIN B-12) 1000 MCG
TABLET ORAL
Refills: 0 | Status: ACTIVE | COMMUNITY

## 2023-03-07 NOTE — HISTORY OF PRESENT ILLNESS
[FreeTextEntry1] : RIGHT-HANDED 79 y/o RHF never smoker with a medical hx of GERD, vertigo, HAs/migraines (takes Exedrin) with recent neck pain in June 2020 after hyperextending the neck. An October 2020 MRI/A head didn’t show stroke, hemorrhage, stenosis or dissection. There was small vessel ischemic disease. 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 A CTA of the head and neck from December 30, 2020 showed no evidence of dissection. 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 A 1yr follow up MRA head was recommended. \par \par Today, she presents to review MRA head. She still complains of generalized headaches. Doing well otherwise.\par \par \par \par \par Handedness: RIGHT\par \par Patient Address:\par [address]\[place] [address]\par \par Referring MD:\par Dr. Sonam Santillan\par [address]\[place] [address]\par \par PCP:\par Dr. Keith Pemberton\par [address]\[place] [address]\par [phone]\par

## 2023-03-07 NOTE — ASSESSMENT
[FreeTextEntry1] : MRA brain from 2/27/23 done at [facility] doesn’t show a definitive aneurysm. \par \par \par Plan:\par No need for further neuroendovascular work up.

## 2023-03-07 NOTE — REASON FOR VISIT
[FreeTextEntry1] : to review annual MRA head head for hx of a small (3 mm) wide necked aneurysmal dilatation of the right MCA bifurcation. \par

## 2023-03-07 NOTE — PHYSICAL EXAM
[General Appearance - Alert] : alert [General Appearance - In No Acute Distress] : in no acute distress [Oriented To Time, Place, And Person] : oriented to person, place, and time [Impaired Insight] : insight and judgment were intact [Motor Tone] : muscle tone was normal in all four extremities [Motor Strength] : muscle strength was normal in all four extremities [] : no respiratory distress [Respiration, Rhythm And Depth] : normal respiratory rhythm and effort

## 2023-03-08 NOTE — ED ADULT TRIAGE NOTE - AS TEMP SITE
March 8, 2023    Cirilo Cerrato  [address]  [place] [address]             [facility]  Pediatrics  [address]  [place] [address]  Phone: [phone]  Fax: [phone]   March 8, 2023     Patient: Cirilo Cerrato   YOB: 2010   Date of Visit: 3/8/2023       To Whom it May Concern:    Cirilo Cerrato was seen in my clinic on 3/8/2023. She may return to school on 3/9/23.    Please excuse her from any classes or work missed.    If you have any questions or concerns, please don't hesitate to call.    Sincerely,           Mavis Blank MD     
oral

## 2023-05-23 ENCOUNTER — APPOINTMENT (OUTPATIENT)
Dept: NEUROLOGY | Facility: CLINIC | Age: 78
End: 2023-05-23
Payer: MEDICARE

## 2023-05-23 VITALS
HEART RATE: 74 BPM | TEMPERATURE: 97.7 F | SYSTOLIC BLOOD PRESSURE: 116 MMHG | WEIGHT: 137 LBS | HEIGHT: 63 IN | DIASTOLIC BLOOD PRESSURE: 84 MMHG | BODY MASS INDEX: 24.27 KG/M2 | OXYGEN SATURATION: 97 %

## 2023-05-23 DIAGNOSIS — G47.00 INSOMNIA, UNSPECIFIED: ICD-10-CM

## 2023-05-23 PROCEDURE — 99214 OFFICE O/P EST MOD 30 MIN: CPT

## 2023-05-23 RX ORDER — GLUCOSAMINE HCL/CHONDROITIN SU 500-400 MG
3 CAPSULE ORAL AT BEDTIME
Qty: 30 | Refills: 5 | Status: ACTIVE | COMMUNITY
Start: 2023-05-23 | End: 1900-01-01

## 2023-06-02 ENCOUNTER — NON-APPOINTMENT (OUTPATIENT)
Age: 78
End: 2023-06-02

## 2023-06-09 DIAGNOSIS — R91.8 OTHER NONSPECIFIC ABNORMAL FINDING OF LUNG FIELD: ICD-10-CM

## 2023-08-14 ENCOUNTER — NON-APPOINTMENT (OUTPATIENT)
Age: 78
End: 2023-08-14

## 2023-08-17 ENCOUNTER — NON-APPOINTMENT (OUTPATIENT)
Age: 78
End: 2023-08-17

## 2023-08-17 ENCOUNTER — APPOINTMENT (OUTPATIENT)
Dept: COLORECTAL SURGERY | Facility: CLINIC | Age: 78
End: 2023-08-17
Payer: MEDICARE

## 2023-08-17 DIAGNOSIS — Z80.3 FAMILY HISTORY OF MALIGNANT NEOPLASM OF BREAST: ICD-10-CM

## 2023-08-17 DIAGNOSIS — Z53.1 PROCEDURE AND TREATMENT NOT CARRIED OUT BECAUSE OF PATIENT'S DECISION FOR REASONS OF BELIEF AND GROUP PRESSURE: ICD-10-CM

## 2023-08-17 PROCEDURE — 99203 OFFICE O/P NEW LOW 30 MIN: CPT | Mod: 25

## 2023-08-17 PROCEDURE — 46600 DIAGNOSTIC ANOSCOPY SPX: CPT

## 2023-08-17 RX ORDER — HYDROCORTISONE 25 MG/G
2.5 CREAM TOPICAL
Qty: 1 | Refills: 3 | Status: ACTIVE | COMMUNITY
Start: 2023-08-17 | End: 1900-01-01

## 2023-08-17 NOTE — ASSESSMENT
[FreeTextEntry1] : Exam findings and diagnosis were discussed at length with patient.  Recommendations including increased fiber intake, adequate daily hydration were discussed. Fiber goal 25-30g/day, consider psyllium supplement. Educational material regarding fiber provided Adequate oral hydration - goal 64oz water/day Avoid constipation and diarrhea, avoid pushing/straining. Laxatives discussed as needed. If no improvement in constipation, recommend GI consultation. Recommend supportive care for hemorrhoids. Medical management, such as preparation H suppositories or hydrocortisone cream was discussed as needed for symptoms.  All questions answered, patient expressed understanding and is agreeable to this plan.

## 2023-08-17 NOTE — HISTORY OF PRESENT ILLNESS
[FreeTextEntry1] : 79 yo F presents for initial evaluation.   Reason for visit: "Hemorrhoids"  Patient referred by Dr. Pemberton  Patient is Jehovah witness, refuses blood or blood products.  Patient denies PMH. Chart review notes H/o GERD, vertigo,?fatty liver Meds reported - Vitamin B12, D3, famotidine. Patient reports PSH Kidney stones removed, but when questioned she denies h/o kidney stones and states she had gallstones and gallbladder removal.  Chart review notes h/o cholecystectomy, tubal ligation 30 years ago, fibroid surgery x 2.   Per chart review, known to neurosx and neuroendovascular for small wide necked aneurysmal dilation of the right MCA bifurcation.  Getting annual MRAs  H/o microscopic hematuria, seeing specialist next week.   Colonoscopy last 3-4 years ago, noted diverticulosis Denies FMH of colorectal CA / IBD.  Mother (+) DM, stroke, Sibling (+) DM, breast CA (sister), brother (leukemia), brother (throat/tongue cancer), daughter breast cancer  She reports for past 2 months, she has had episodes of lower abdominal discomfort CT scans show diverticulosis but never diverticulitis Never given antibiotics for diverticulitis  Feels something present in anal area when moving bowels for past month.  She was referred to a colorectal surgery NP and was given a sample of a cream, patient used for 5 days. did not help because she still feels something on outside at times.  She reports pressure but denies pain in anal area. No itching  She does not see a GI. h/o constipation, takes dulcolax prn if she doesn't feel like she is fully emptying. Denies BRBPR She typically moves bowels daily, but usually small amounts/pellets. Denies fiber supplement or daily bowel regimen.  reports diarrhea about 1.5 months ago, had accident of stool at that time.

## 2023-08-17 NOTE — PHYSICAL EXAM
[FreeTextEntry1] : Medical assistant present for duration of physical examination  General no acute distress, alert and oriented Psych calm, pleasant demeanor, responding appropriately to questions Nonlabored breathing Ambulating without assistance Skin normal color and pigment, no visible lesions or rashes  Anorectal Exam: Inspection no erythema, induration or fluctuance, no skin excoriation, no fissure, soft small external hemorrhoid right anterior TOM nontender, no masses palpated, no blood on gloved finger, small pellet stool palpated in anal canal  Procedure: Anoscopy  Pre procedure Diagnosis: hemorrhoids Post procedure Diagnosis: hemorrhoids Anesthesia: none Estimated blood loss: none Specimen: none Complications: none  Consent obtained. Anoscopy was performed by passing a lighted anoscope with lubricant jelly into the anal canal and the entire anal mucosal surface was inspected. Findings included no fissure, mild internal hemorrhoids with inflammation, no visible masses or lesions in anal canal, small pellet stool seen in anal canal  Patient tolerated examination and procedure well.

## 2023-08-24 ENCOUNTER — APPOINTMENT (OUTPATIENT)
Dept: PULMONOLOGY | Facility: CLINIC | Age: 78
End: 2023-08-24
Payer: MEDICARE

## 2023-08-24 VITALS
HEIGHT: 63 IN | SYSTOLIC BLOOD PRESSURE: 114 MMHG | DIASTOLIC BLOOD PRESSURE: 74 MMHG | OXYGEN SATURATION: 96 % | BODY MASS INDEX: 24.27 KG/M2 | WEIGHT: 137 LBS | TEMPERATURE: 97.9 F | HEART RATE: 75 BPM

## 2023-08-24 PROCEDURE — 94727 GAS DIL/WSHOT DETER LNG VOL: CPT

## 2023-08-24 PROCEDURE — 99214 OFFICE O/P EST MOD 30 MIN: CPT | Mod: 25

## 2023-08-24 PROCEDURE — 94729 DIFFUSING CAPACITY: CPT

## 2023-08-24 PROCEDURE — 94060 EVALUATION OF WHEEZING: CPT

## 2023-08-24 NOTE — ASSESSMENT
[FreeTextEntry1] : Data reviewed:  CT chest 5/2/2023 [facility] personally reviewed: 1. The dominant right lower lobe nodule has decreased in size. 2. Stable interstitial lung disease without evidence of bronchiectasis. 3. Thyroid nodules, as seen on the prior thyroid ultrasound, 2/12/2021.  PFT 8/24/2023: FVC 2.90L (110%), FEV1 2.19L (109%), TLC 4.65L (97%), DLCO 14.95 (76%)  Impression: Interstitial lung abnormality Lung nodules decreasing in size, never smoker  Plan: Scan is reassuring as to nodules. For ANNE in this asymptomatic woman w normal lung function, will see her back in one year w repeat PFT.

## 2023-08-24 NOTE — HISTORY OF PRESENT ILLNESS
[TextBox_4] : 06/08/2022 [Kattih]: This is a pleasant 77F with history of uterine polyp and recent CT chest who was referred for CT chest findings by her primary care. Patient was planned for removal of polyp on uterus on 6/7 with Dr. Veronica Jacobs ([facility]), but abnormal CXR led to CT chest and therefore the procedure was deferred. Patient reports no pulmonary issues. Never smoker, no history of childhood pulmonary infections or asthma, no vaping. Exercise capacity is never limited. She had COVID in Apr 2022 and recovered uneventfully at home. COVID boosted. No history of anesthesia complications in the past and she has had multiple surgeries. No family history of autoimmune diseases, no personal history of autoimmune diseases, no family history of lung diseases.    8/24/2023: Has been well from respiratory POV, just struggling w diverticulosis. No major illnesses in interim. Not dyspneic.

## 2023-08-24 NOTE — CONSULT LETTER
[Dear  ___] : Dear  [unfilled], [Courtesy Letter:] : I had the pleasure of seeing your patient, [unfilled], in my office today. [Please see my note below.] : Please see my note below. [Consult Closing:] : Thank you very much for allowing me to participate in the care of this patient.  If you have any questions, please do not hesitate to contact me. [Sincerely,] : Sincerely, [FreeTextEntry2] : Keith Pemberton MD\par  [address] \[place] [address] [FreeTextEntry3] : Tita Lima MD, FCCP\par

## 2023-09-05 ENCOUNTER — APPOINTMENT (OUTPATIENT)
Dept: OTOLARYNGOLOGY | Facility: CLINIC | Age: 78
End: 2023-09-05
Payer: MEDICARE

## 2023-09-05 VITALS
WEIGHT: 137 LBS | SYSTOLIC BLOOD PRESSURE: 123 MMHG | HEART RATE: 77 BPM | BODY MASS INDEX: 24.27 KG/M2 | TEMPERATURE: 96.6 F | HEIGHT: 63 IN | DIASTOLIC BLOOD PRESSURE: 88 MMHG

## 2023-09-05 DIAGNOSIS — H93.13 TINNITUS, BILATERAL: ICD-10-CM

## 2023-09-05 DIAGNOSIS — H92.09 OTALGIA, UNSPECIFIED EAR: ICD-10-CM

## 2023-09-05 PROCEDURE — 99213 OFFICE O/P EST LOW 20 MIN: CPT

## 2023-09-05 NOTE — ASSESSMENT
[FreeTextEntry1] : 78F here in followup. She c/o noise in her ears. It is high pitched, 'hissing' and nonpulsatile. It has been present, mostly unchanged, for years. There is no otorrhea, otalgia, vertigo. Audiogram from 8/2021 shows essentially symmetric normal hearing sloping to moderately severe SNHL with excellent speech discrimination, as above. Complete and comprehensive head and neck exam is unremarkable. Tinnitus due to underlying high frequency SNHL. Reviewed dietary/lifestyle changes. RTO should sx worsen or new sx develop. Will send to neurosurgery to reestablish care given her known aneurysm.

## 2023-09-05 NOTE — DATA REVIEWED
[de-identified] : Audiogram 8/2021:\par  Audiogram from today:\par  essentially symmetric normal hearing sloping to moderately severe SNHL. \par  R SRT 30, 100% discrim\par  L SRT 25, 90% discrim\par  type A tymps\par

## 2023-09-05 NOTE — CONSULT LETTER
[Dear  ___] : Dear  [unfilled], [Courtesy Letter:] : I had the pleasure of seeing your patient, [unfilled], in my office today. [Consult Closing:] : Thank you very much for allowing me to participate in the care of this patient.  If you have any questions, please do not hesitate to contact me. [Sincerely,] : Sincerely, [FreeTextEntry3] : Martín Santos MD\par  Department of Otolaryngology - Head and Neck Surgery\par  [facility]

## 2023-09-05 NOTE — HISTORY OF PRESENT ILLNESS
[de-identified] : 78F here in followup.  She c/o noise in her ears. It is high pitched, 'hissing' and nonpulsatile. It has been present, mostly unchanged, for years. There is no otorrhea, otalgia, vertigo.  Audiogram from 8/2021: essentially symmetric normal hearing sloping to moderately severe SNHL. -R SRT 30, 100% discrim -L SRT 25, 90% discrim -type A tymps  ROS otherwise unremarkable.

## 2023-09-05 NOTE — PHYSICAL EXAM
[de-identified] : no pain, mass or lesion [de-identified] : Au: EAC clear, TM intact and mobile, ME clear [Midline] : trachea located in midline position [Normal] : no rashes

## 2023-09-26 ENCOUNTER — APPOINTMENT (OUTPATIENT)
Dept: NEUROSURGERY | Facility: CLINIC | Age: 78
End: 2023-09-26
Payer: MEDICARE

## 2023-09-26 VITALS
BODY MASS INDEX: 24.27 KG/M2 | SYSTOLIC BLOOD PRESSURE: 131 MMHG | RESPIRATION RATE: 18 BRPM | HEART RATE: 73 BPM | WEIGHT: 137 LBS | HEIGHT: 63 IN | DIASTOLIC BLOOD PRESSURE: 82 MMHG | OXYGEN SATURATION: 98 %

## 2023-09-26 DIAGNOSIS — I67.1 CEREBRAL ANEURYSM, NONRUPTURED: ICD-10-CM

## 2023-09-26 PROCEDURE — 99215 OFFICE O/P EST HI 40 MIN: CPT

## 2023-10-09 ENCOUNTER — APPOINTMENT (OUTPATIENT)
Dept: NEPHROLOGY | Facility: CLINIC | Age: 78
End: 2023-10-09
Payer: MEDICARE

## 2023-10-09 VITALS
DIASTOLIC BLOOD PRESSURE: 80 MMHG | SYSTOLIC BLOOD PRESSURE: 126 MMHG | WEIGHT: 137 LBS | HEIGHT: 63 IN | BODY MASS INDEX: 24.27 KG/M2

## 2023-10-09 DIAGNOSIS — N13.30 UNSPECIFIED HYDRONEPHROSIS: ICD-10-CM

## 2023-10-09 DIAGNOSIS — R03.0 ELEVATED BLOOD-PRESSURE READING, W/OUT DIAGNOSIS OF HYPERTENSION: ICD-10-CM

## 2023-10-09 DIAGNOSIS — N17.9 ACUTE KIDNEY FAILURE, UNSPECIFIED: ICD-10-CM

## 2023-10-09 DIAGNOSIS — N20.0 CALCULUS OF KIDNEY: ICD-10-CM

## 2023-10-09 PROCEDURE — 99214 OFFICE O/P EST MOD 30 MIN: CPT

## 2023-10-19 ENCOUNTER — APPOINTMENT (OUTPATIENT)
Dept: NEUROLOGY | Facility: CLINIC | Age: 78
End: 2023-10-19
Payer: MEDICARE

## 2023-10-19 VITALS
DIASTOLIC BLOOD PRESSURE: 71 MMHG | HEART RATE: 67 BPM | BODY MASS INDEX: 24.27 KG/M2 | OXYGEN SATURATION: 98 % | HEIGHT: 63 IN | WEIGHT: 137 LBS | TEMPERATURE: 97.2 F | SYSTOLIC BLOOD PRESSURE: 114 MMHG

## 2023-10-19 DIAGNOSIS — R26.89 OTHER ABNORMALITIES OF GAIT AND MOBILITY: ICD-10-CM

## 2023-10-19 PROCEDURE — 99214 OFFICE O/P EST MOD 30 MIN: CPT

## 2024-01-18 ENCOUNTER — APPOINTMENT (OUTPATIENT)
Dept: COLORECTAL SURGERY | Facility: CLINIC | Age: 79
End: 2024-01-18
Payer: MEDICARE

## 2024-01-18 VITALS
SYSTOLIC BLOOD PRESSURE: 130 MMHG | HEIGHT: 63 IN | HEART RATE: 83 BPM | WEIGHT: 139 LBS | BODY MASS INDEX: 24.63 KG/M2 | DIASTOLIC BLOOD PRESSURE: 80 MMHG | TEMPERATURE: 97.7 F

## 2024-01-18 DIAGNOSIS — K59.09 OTHER CONSTIPATION: ICD-10-CM

## 2024-01-18 DIAGNOSIS — K64.8 OTHER HEMORRHOIDS: ICD-10-CM

## 2024-01-18 PROCEDURE — 46600 DIAGNOSTIC ANOSCOPY SPX: CPT

## 2024-01-18 NOTE — ASSESSMENT
[FreeTextEntry1] : Importance of constipation management reviewed with patient. Role of high fiber diet and fiber supplement to bulk stool reviewed. Fiber goal 25g/day, recommend psyllium supplement. Educational material regarding fiber provided.  Goal oral hydration - goal 64oz water/day. Patient encouraged to f/u with GI for further constipation management, as well as to schedule colonoscopy (as she states she is due). If persistent hemorrhoidal symtpoms despite optimization of her bowel habits, discussed rubber band ligation can be considered as an alternative treatment option. All questions were answered, patient expressed understanding, and is agreeable to this plan.

## 2024-01-18 NOTE — HISTORY OF PRESENT ILLNESS
[FreeTextEntry1] : 79 yo F presents for follow up   Known to Gi Dr. Pemberton  Patient is a Jehovah witness, refuses blood or blood products  Patient denies PMH. Chart review notes H/o GERD, vertigo,?fatty liver Meds reported - Vitamin B12, D3, famotidine. Patient reports PSH Kidney stones removed, but when questioned she denies h/o kidney stones and states she had gallstones and gallbladder removal. Chart review notes h/o cholecystectomy, tubal ligation 30 years ago, fibroid surgery x 2. 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 Getting annual MRAs  Colonoscopy last 3-4 years ago, noted diverticulosis.   Denies FMH of colorectal ca/ IBD. Mother (+) DM, stroke, Sibling (+) DM, breast CA (sister), brother (leukemia), brother (throat/tongue cancer), daughter breast cancer  Seen for initial consultation 8/17/23, c/o lower abdominal discomfort and the sensation that something is present in anal area when moving bowels for past month. Exam noted soft small external hemorrhoid right anterior. TOM nontender, small pellet stool palpated in anal canal. Mild internal hemorrhoids with inflammation, small pellet of stool seen in anal canal. Pt advised on increase fiber intake and to consult GI for constipation management. Supportive measures for hemorrhoids, such as preparation H suppositories or hydrocortisone cream (Rx provided).  Interim pt had a trip and fall triggered dizziness. Seen by Neuro 10/19/23, CTA at OSH revealed no stroke, stable small aneurysm for which Neurosx Dr. Tsang suggests repeat MRA 8/2024.   Patient returns today in follow up, patient states today she continues to feel like a small amount if tissue hanging. Manually reduces usually when in shower and notices it comes out later on in the day. Patient also reports when she sits she feels external tissue being pushed back up. At last visit was Rxed Hydrocortisone 2.5% and uses it 1x a day since last visit but reports does not see much of a change. States some discomfort with sitting on a flat surface. Denies any rectal bleeding or pain.   Patient also noting thick mucous and other times watery discharge from anal area, patient has to wear a panty liner. Also reports unable to control gas at times.  BM: 1-4x a day, stool varies but mostly reports small pellet like stool, at times feels incomplete evacuation.  Intermittent use of Dulcolax when she feels constipated, this occurs about once every 2 weeks or so. Denies taking fiber supplements. Denies straining. Denies sitting on bowl for long periods of time.  States she saw GI within past year, who recommended colonoscopy as she is due, but states she has not followed up or discussed her constipation issues with her GI.  Denies any NSAID/ASA use in the last 7 days.

## 2024-01-18 NOTE — PHYSICAL EXAM
[FreeTextEntry1] : Medical assistant present for duration of physical examination  General no acute distress, alert and oriented Psych calm, in good spirits Nonlabored breathing Ambulating without assistance Skin normal color and pigment, no visible lesions or rashes  Anorectal Exam: Inspection no erythema, induration or fluctuance, no skin excoriation, no fissure, soft small external hemorrhoid right anterior When asked to strain, no tissue prolapse occurs TOM nontender, no masses palpated, no blood on gloved finger, small rectocele  Procedure: Anoscopy  Pre procedure Diagnosis: hemorrhoids Post procedure Diagnosis: hemorrhoids Anesthesia: none Estimated blood loss: none Specimen: none Complications: none  Consent obtained. Anoscopy was performed by passing a lighted anoscope with lubricant jelly into the anal canal and the entire anal mucosal surface was inspected. Findings included no fissure, mild internal hemorrhoid right anterior  Patient tolerated examination and procedure well.

## 2024-02-11 ENCOUNTER — NON-APPOINTMENT (OUTPATIENT)
Age: 79
End: 2024-02-11

## 2024-02-20 ENCOUNTER — NON-APPOINTMENT (OUTPATIENT)
Age: 79
End: 2024-02-20

## 2024-04-09 ENCOUNTER — APPOINTMENT (OUTPATIENT)
Dept: NEUROLOGY | Facility: CLINIC | Age: 79
End: 2024-04-09
Payer: MEDICARE

## 2024-04-09 VITALS
HEIGHT: 63 IN | HEART RATE: 61 BPM | BODY MASS INDEX: 24.45 KG/M2 | TEMPERATURE: 96.3 F | SYSTOLIC BLOOD PRESSURE: 123 MMHG | OXYGEN SATURATION: 97 % | WEIGHT: 138 LBS | DIASTOLIC BLOOD PRESSURE: 83 MMHG

## 2024-04-09 PROCEDURE — G2211 COMPLEX E/M VISIT ADD ON: CPT

## 2024-04-09 PROCEDURE — 99213 OFFICE O/P EST LOW 20 MIN: CPT

## 2024-04-09 RX ORDER — OMEPRAZOLE 20 MG/1
20 TABLET, DELAYED RELEASE ORAL
Refills: 0 | Status: DISCONTINUED | COMMUNITY
End: 2024-04-09

## 2024-07-03 ENCOUNTER — NON-APPOINTMENT (OUTPATIENT)
Age: 79
End: 2024-07-03

## 2024-08-19 ENCOUNTER — APPOINTMENT (OUTPATIENT)
Dept: PULMONOLOGY | Facility: CLINIC | Age: 79
End: 2024-08-19

## 2024-09-29 ENCOUNTER — NON-APPOINTMENT (OUTPATIENT)
Age: 79
End: 2024-09-29

## 2024-09-30 ENCOUNTER — APPOINTMENT (OUTPATIENT)
Dept: PULMONOLOGY | Facility: CLINIC | Age: 79
End: 2024-09-30
Payer: MEDICARE

## 2024-09-30 VITALS
DIASTOLIC BLOOD PRESSURE: 90 MMHG | SYSTOLIC BLOOD PRESSURE: 110 MMHG | HEART RATE: 74 BPM | HEIGHT: 63 IN | WEIGHT: 135 LBS | TEMPERATURE: 97.1 F | BODY MASS INDEX: 23.92 KG/M2 | OXYGEN SATURATION: 96 %

## 2024-09-30 DIAGNOSIS — J84.9 INTERSTITIAL PULMONARY DISEASE, UNSPECIFIED: ICD-10-CM

## 2024-09-30 PROCEDURE — 94727 GAS DIL/WSHOT DETER LNG VOL: CPT

## 2024-09-30 PROCEDURE — 94010 BREATHING CAPACITY TEST: CPT

## 2024-09-30 PROCEDURE — 99213 OFFICE O/P EST LOW 20 MIN: CPT | Mod: 25

## 2024-09-30 PROCEDURE — 94729 DIFFUSING CAPACITY: CPT

## 2024-09-30 NOTE — ASSESSMENT
[FreeTextEntry1] : Data reviewed:  CT chest 5/2/2023 [facility] personally reviewed:  Stable interstitial lung disease compared to 5/2022  PFT 8/24/2023: FVC 2.90L (110%), FEV1 2.19L (109%), TLC 4.65L (97%), DLCO 14.95 (76%) PFT 9/30/2024: FVC 2.70L (104%), FEV1 2.14L (108%), TLC 4.15L (87%), DLCO 12.31 (63%)  Impression: Interstitial lung abnormality Lung nodules decreased in size, never smoker  Plan: Will repeat PFT in 6 mos. If down again --> CT. If stable or up might opt to repeat scan in 3 years, 2026.

## 2024-09-30 NOTE — HISTORY OF PRESENT ILLNESS
[TextBox_4] : 06/08/2022 [Kattih]: This is a pleasant 77F with history of uterine polyp and recent CT chest who was referred for CT chest findings by her primary care. Patient was planned for removal of polyp on uterus on 6/7 with Dr. Veronica Jacobs ([facility]), but abnormal CXR led to CT chest and therefore the procedure was deferred. Patient reports no pulmonary issues. Never smoker, no history of childhood pulmonary infections or asthma, no vaping. Exercise capacity is never limited. She had COVID in Apr 2022 and recovered uneventfully at home. COVID boosted. No history of anesthesia complications in the past and she has had multiple surgeries. No family history of autoimmune diseases, no personal history of autoimmune diseases, no family history of lung diseases.    8/24/2023: Has been well from respiratory POV, just struggling w diverticulosis. No major illnesses in interim. Not dyspneic.  9/30/2024: Had a cold a couple of weeks ago but has recovered. Otherwise breathing has been fine over past year. Due to brain MRI for aneurysm surveillance. Having vertigo, balance issues. Stays busy, walks. Has heartburn. Mild hemorrhoids.

## 2024-09-30 NOTE — PHYSICAL EXAM
[No Acute Distress] : no acute distress [Normal Rate/Rhythm] : normal rate/rhythm [Normal S1, S2] : normal s1, s2 [No Murmurs] : no murmurs [No Resp Distress] : no resp distress [TextBox_68] : few fine rales L base

## 2024-11-21 ENCOUNTER — APPOINTMENT (OUTPATIENT)
Dept: ENDOCRINOLOGY | Facility: CLINIC | Age: 79
End: 2024-11-21

## 2025-03-03 ENCOUNTER — APPOINTMENT (OUTPATIENT)
Dept: PULMONOLOGY | Facility: CLINIC | Age: 80
End: 2025-03-03
Payer: MEDICARE

## 2025-03-03 VITALS
OXYGEN SATURATION: 96 % | WEIGHT: 138 LBS | HEIGHT: 63 IN | DIASTOLIC BLOOD PRESSURE: 98 MMHG | SYSTOLIC BLOOD PRESSURE: 140 MMHG | BODY MASS INDEX: 24.45 KG/M2 | TEMPERATURE: 97.9 F | HEART RATE: 81 BPM

## 2025-03-03 PROCEDURE — 94010 BREATHING CAPACITY TEST: CPT

## 2025-03-03 PROCEDURE — 94727 GAS DIL/WSHOT DETER LNG VOL: CPT

## 2025-03-03 PROCEDURE — 99213 OFFICE O/P EST LOW 20 MIN: CPT | Mod: 25

## 2025-03-03 PROCEDURE — 94729 DIFFUSING CAPACITY: CPT

## 2025-03-18 ENCOUNTER — APPOINTMENT (OUTPATIENT)
Dept: NEUROSURGERY | Facility: CLINIC | Age: 80
End: 2025-03-18
Payer: MEDICARE

## 2025-03-18 VITALS
DIASTOLIC BLOOD PRESSURE: 90 MMHG | HEART RATE: 71 BPM | RESPIRATION RATE: 18 BRPM | TEMPERATURE: 97.5 F | OXYGEN SATURATION: 97 % | BODY MASS INDEX: 24.45 KG/M2 | WEIGHT: 138 LBS | HEIGHT: 63 IN | SYSTOLIC BLOOD PRESSURE: 149 MMHG

## 2025-03-18 DIAGNOSIS — R93.89 ABNORMAL FINDINGS ON DIAGNOSTIC IMAGING OF OTHER SPECIFIED BODY STRUCTURES: ICD-10-CM

## 2025-03-18 DIAGNOSIS — I67.1 CEREBRAL ANEURYSM, NONRUPTURED: ICD-10-CM

## 2025-03-18 PROCEDURE — 99215 OFFICE O/P EST HI 40 MIN: CPT

## 2025-04-14 NOTE — DISCHARGE NOTE ADULT - PRINCIPAL DIAGNOSIS
Airway    Performed by: Moo Josue MD  Authorized by: Moo Josue MD    Final Airway Type:  Endotracheal airway  Final Endotracheal Airway*:  ETT  ETT Size (mm)*:  8.0  Cuff*:  Regular  Technique Used for Successful ETT Placement:  Video laryngoscopy  Devices/Methods Used in Placement*:  Mask, Oral ETT and Stylet  Intubation Procedure*:  ETCO2, Preoxygenation, Atraumatic, Dentition Unchanged and Pharynx Clear  Insertion Site:  Oral  Blade Type*:  Video Laryngoscope  Blade Size*:  3  Placement Verified by: auscultation, capnometry, palpation of cuff and equal breath sounds    Glottic View*:  1 - full view of glottis  Attempts*:  1  Number of Other Approaches Attempted:  0   Patient Identified, Procedure confirmed, Emergency equipment available and Safety protocols followed  Location:  OR  Urgency:  Elective  Difficult Airway: No    Indications for Airway Management:  Anesthesia  Sedation Level:  Anesthetized  Mask Difficulty Assessment:  1 - vent by mask  Start Time: 4/14/2025 8:40 AM       Cholecystitis

## 2025-08-26 ENCOUNTER — NON-APPOINTMENT (OUTPATIENT)
Age: 80
End: 2025-08-26

## 2025-08-26 ENCOUNTER — APPOINTMENT (OUTPATIENT)
Dept: NEUROLOGY | Facility: CLINIC | Age: 80
End: 2025-08-26
Payer: MEDICARE

## 2025-08-26 VITALS
HEIGHT: 63 IN | TEMPERATURE: 97.5 F | SYSTOLIC BLOOD PRESSURE: 136 MMHG | OXYGEN SATURATION: 97 % | HEART RATE: 77 BPM | BODY MASS INDEX: 24.8 KG/M2 | WEIGHT: 140 LBS | DIASTOLIC BLOOD PRESSURE: 83 MMHG

## 2025-08-26 DIAGNOSIS — R41.3 OTHER AMNESIA: ICD-10-CM

## 2025-08-26 PROCEDURE — 99214 OFFICE O/P EST MOD 30 MIN: CPT

## 2025-08-26 PROCEDURE — G2211 COMPLEX E/M VISIT ADD ON: CPT

## 2025-09-11 ENCOUNTER — APPOINTMENT (OUTPATIENT)
Dept: OTOLARYNGOLOGY | Facility: CLINIC | Age: 80
End: 2025-09-11
Payer: MEDICARE

## 2025-09-11 VITALS — HEIGHT: 61 IN | BODY MASS INDEX: 24.55 KG/M2 | WEIGHT: 130 LBS

## 2025-09-11 DIAGNOSIS — H93.13 TINNITUS, BILATERAL: ICD-10-CM

## 2025-09-11 DIAGNOSIS — J34.3 HYPERTROPHY OF NASAL TURBINATES: ICD-10-CM

## 2025-09-11 DIAGNOSIS — H69.92 UNSPECIFIED EUSTACHIAN TUBE DISORDER, LEFT EAR: ICD-10-CM

## 2025-09-11 DIAGNOSIS — H90.3 SENSORINEURAL HEARING LOSS, BILATERAL: ICD-10-CM

## 2025-09-11 PROCEDURE — 99214 OFFICE O/P EST MOD 30 MIN: CPT | Mod: 25

## 2025-09-11 PROCEDURE — 92550 TYMPANOMETRY & REFLEX THRESH: CPT | Mod: 52

## 2025-09-11 PROCEDURE — 31231 NASAL ENDOSCOPY DX: CPT

## 2025-09-11 PROCEDURE — 92557 COMPREHENSIVE HEARING TEST: CPT

## 2025-09-11 PROCEDURE — 92504 EAR MICROSCOPY EXAMINATION: CPT

## (undated) DEVICE — SLV COMPRESSION KNEE MED

## (undated) DEVICE — POSITIONER FOAM EGG CRATE ULNAR 2PCS (PINK)

## (undated) DEVICE — TUBING SET GRAVITY 2 SPIKE

## (undated) DEVICE — POSITIONER STRAP ARMBOARD 1.5X32" DISP

## (undated) DEVICE — PACK D&C

## (undated) DEVICE — DRSG PAD SANITARY OB

## (undated) DEVICE — FLUENT FMS PROCEDURE KIT

## (undated) DEVICE — WARMING BLANKET UPPER ADULT

## (undated) DEVICE — GLV 6.5 PROTEXIS (WHITE)

## (undated) DEVICE — PRESSURE INFUSOR BAG 1000ML

## (undated) DEVICE — SOL INJ NS 0.9% 1000ML

## (undated) DEVICE — OS FINDER